# Patient Record
Sex: FEMALE | Race: WHITE
[De-identification: names, ages, dates, MRNs, and addresses within clinical notes are randomized per-mention and may not be internally consistent; named-entity substitution may affect disease eponyms.]

---

## 2018-01-20 ENCOUNTER — HOSPITAL ENCOUNTER (INPATIENT)
Dept: HOSPITAL 68 - ERH | Age: 53
LOS: 4 days | Discharge: NURSING, REHAB OR LONG-TERM CARE | DRG: 426 | End: 2018-01-24
Admitting: INTERNAL MEDICINE
Payer: COMMERCIAL

## 2018-01-20 VITALS — HEIGHT: 69 IN | BODY MASS INDEX: 43.4 KG/M2 | WEIGHT: 293 LBS

## 2018-01-20 VITALS — SYSTOLIC BLOOD PRESSURE: 121 MMHG | DIASTOLIC BLOOD PRESSURE: 73 MMHG

## 2018-01-20 DIAGNOSIS — L29.9: ICD-10-CM

## 2018-01-20 DIAGNOSIS — I25.10: ICD-10-CM

## 2018-01-20 DIAGNOSIS — L30.9: ICD-10-CM

## 2018-01-20 DIAGNOSIS — E66.01: ICD-10-CM

## 2018-01-20 DIAGNOSIS — N32.81: ICD-10-CM

## 2018-01-20 DIAGNOSIS — I95.9: ICD-10-CM

## 2018-01-20 DIAGNOSIS — F32.9: ICD-10-CM

## 2018-01-20 DIAGNOSIS — F39: ICD-10-CM

## 2018-01-20 DIAGNOSIS — Z91.81: ICD-10-CM

## 2018-01-20 DIAGNOSIS — F10.10: ICD-10-CM

## 2018-01-20 DIAGNOSIS — R26.9: ICD-10-CM

## 2018-01-20 DIAGNOSIS — I10: ICD-10-CM
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
## 2018-01-20 DIAGNOSIS — M54.9: ICD-10-CM

## 2018-01-20 DIAGNOSIS — F17.200: ICD-10-CM

## 2018-01-20 DIAGNOSIS — E87.1: Primary | ICD-10-CM

## 2018-01-20 DIAGNOSIS — R21: ICD-10-CM

## 2018-01-20 DIAGNOSIS — E87.5: ICD-10-CM

## 2018-01-20 DIAGNOSIS — F43.10: ICD-10-CM

## 2018-01-20 DIAGNOSIS — B18.2: ICD-10-CM

## 2018-01-20 DIAGNOSIS — B85.0: ICD-10-CM

## 2018-01-20 DIAGNOSIS — D64.9: ICD-10-CM

## 2018-01-20 DIAGNOSIS — K21.9: ICD-10-CM

## 2018-01-20 DIAGNOSIS — R74.0: ICD-10-CM

## 2018-01-20 DIAGNOSIS — E86.0: ICD-10-CM

## 2018-01-20 LAB
ABSOLUTE GRANULOCYTE CT: 8 /CUMM (ref 1.4–6.5)
BASOPHILS # BLD: 0 /CUMM (ref 0–0.2)
BASOPHILS NFR BLD: 0 % (ref 0–2)
EOSINOPHIL # BLD: 0 /CUMM (ref 0–0.7)
EOSINOPHIL NFR BLD: 0.2 % (ref 0–5)
ERYTHROCYTE [DISTWIDTH] IN BLOOD BY AUTOMATED COUNT: 14.8 % (ref 11.5–14.5)
GRANULOCYTES NFR BLD: 87 % (ref 42.2–75.2)
HCT VFR BLD CALC: 37.1 % (ref 37–47)
LYMPHOCYTES # BLD: 0.8 /CUMM (ref 1.2–3.4)
MCH RBC QN AUTO: 32 PG (ref 27–31)
MCHC RBC AUTO-ENTMCNC: 33.3 G/DL (ref 33–37)
MCV RBC AUTO: 96 FL (ref 81–99)
MONOCYTES # BLD: 0.4 /CUMM (ref 0.1–0.6)
PLATELET # BLD: 363 /CUMM (ref 130–400)
PMV BLD AUTO: 6.9 FL (ref 7.4–10.4)
RED BLOOD CELL CT: 3.86 /CUMM (ref 4.2–5.4)
WBC # BLD AUTO: 9.2 /CUMM (ref 4.8–10.8)

## 2018-01-20 PROCEDURE — 84133 ASSAY OF URINE POTASSIUM: CPT

## 2018-01-20 PROCEDURE — 84300 ASSAY OF URINE SODIUM: CPT

## 2018-01-20 NOTE — RADIOLOGY REPORT
EXAMINATION:
XR PORTABLE CHEST
 
CLINICAL INFORMATION:
Hyponatremia.
 
COMPARISON:
Chest 09/09/2017.
 
TECHNIQUE:
Portable frontal view of the chest was obtained.
 
FINDINGS:
Both lungs are fairly well-expanded and clear of acute process. The heart
size and pulmonary vascularity is normal. No gross bony abnormality seen.
 
IMPRESSION:
Unremarkable chest exam.

## 2018-01-20 NOTE — ED GENERAL ADULT
History of Present Illness
 
General
Chief Complaint: General Adult
Stated Complaint: BIBA FAILURE TO THRIVE
Source: patient, EMS
Exam Limitations: no limitations
 
Vital Signs & Intake/Output
Vital Signs & Intake/Output
 Vital Signs
 
 
Date Time Temp Pulse Resp B/P B/P Pulse O2 O2 Flow FiO2
 
     Mean Ox Delivery Rate 
 
 0656 98.0 109 18 112/56  93   
 
 2327 97.8 95 20 121/73  94 Room Air  
 
 2142 99.4 103 20 107/52  94 Room Air  
 
 2002 99.3 96 16 118/63  97 Room Air  
 
 1813 98.6 98 18 95/57  96 Room Air  
 
 1758       Room Air  
 
 1610 98.7 84 16 114/60  96 Room Air  
 
 1410  90 20 118/55  98 Room Air  
 
 1308       Room Air  
 
 1307 98.2 89 18 98/61  100 Room Air  
 
 
 ED Intake and Output
 
 
  0000  1200
 
Intake Total 1000 
 
Output Total 460 
 
Balance 540 
 
   
 
Intake, IV 1000 
 
Output, Urine 460 
 
Patient 325 lb 
 
Weight  
 
Weight Reported by Patient 
 
Measurement  
 
Method  
 
 
 
Reconcile Medications
Atenolol 25 MG TABLET   1 TAB PO DAILY HIGH BLOOD PRESSURE  (Reported)
Famotidine 20 MG TABLET   1 TAB PO DAILY GI  (Reported)
Fluoxetine HCl 20 MG CAPSULE   1 CAP PO DAILY DEPRESSION
Hydroxyzine Hydrochloride (Atarax) 25 MG TABLET   1 TAB PO Q8H PRN itching  (
Reported)
Lorazepam 1 MG TABLET   1 TAB PO BID anxiety
Losartan/Hydrochlorothiazide (Hyzaar 100-25 Tablet) 100 MG-25 MG TABLET   1 TAB 
PO DAILY BP  (Reported)
Meloxicam 15 MG TABLET   1 TAB PO DAILY PAIN CONTROL
Nystatin 100,000 UNIT/GRAM POWDER   1 BLANCA TOP BID RASH  (Reported)
Oxybutynin Chloride (Oxybutynin Chloride ER) 15 MG TAB.ER.24   1 TAB PO BID 
BLADDER  (Reported)
Pantoprazole Sodium (Protonix) 40 MG TABLET.DR   1 TAB PO DAILY GI  (Reported)
Triamcinolone Acetonide 0.1 % CREAM..G.   1 BLANCA TOP BID rash
     APPLY SPARINGLY OVER THE LEFT CHEST (AVOID BREAST), ABDOMEN
     AND THIGH AREAS BID
Zolpidem Tartrate (Ambien) 10 MG TABLET   1 TAB PO QHS PRN insomnia
 
Triage Nurses Notes Reviewed? yes
Onset: Gradual
Duration: hour(s):
Timing: recent history
Injury Environment: home
Severity: moderate
HPI:
51yo female with hx of HTN, depression, obesity BIBA to ED complaining of 
increasing weakness and rash across body.  Patient states that she has chronic 
pain in her back and knees and has difficulty walking at baseline.  Patient 
states that she has been on the couch for the past week, unable to walk around 
and move, she wears diapers she cannot walk to the bathroom.  Last night patient
tried to walk and moved onto the ground on her knees however she could not get 
up to walk and remain on the ground all night.  Patient's fianc called EMS 
today she could not get up.  Patient has had rash across most of her body for 
one month, she has been applying hydrocortisone cream as well as talcum powder. 
Patient states she has a fungal rash both armpits which is very painful.  She is
due to see her primary care doctor in 4 days.  Patient denies fevers, chills, 
dyspnea, chest pain, abdominal pain or vomiting.
(Elsy Cha)
Allergies
Coded Allergies:
codeine (Mild, ITCHING/HIVES 18)
Penicillins (RASH, WELTS 16)
Uncoded Allergies:
Red meat (Mild, HIVES 17)
  Secondary to Lyme disease.
 
(Emmanuel PEREZ,Imer DE LUNA)
 
Past History
 
Medical History
Any Pertinent Medical History? see below for history
Neurological: Lyme disease
EENT: NONE
Cardiovascular: hypertension
Respiratory: NONE
Gastrointestinal: constipation, GERD, Gall bladder attack approx. 17 @ [facility]
Hepatic: hepatitis C, Pt reports HCV has cleared with Harvoni Tx
Renal: INCONTINENCE Overactive bladder
Musculoskeletal: chronic back pain, degen joint disease, osteoarthritis
Psychiatric: anxiety, depression, mood disorder NOS PTSD alcohol abuse history 
of cocaine abuse
Endocrine: R/O THYROID DISEASE
Blood Disorders: anemia
Cancer(s): NONE
GYN/Reproductive: NONE
Other Medical Hx:
OVER ACTIVE BLADDER
History of MRSA: No
History of VRE: No
History of CDIFF: No
Influenza Vaccine: 17
 
Surgical History
Surgical History: non-contributory, N
 
Psychosocial History
Who do you live with Significant Other
Services at Home None
What is your primary language English
 
Family History
Family History, If Any:
Relation not specified for:
  *No pertinent family history
 
Hx Contributory? No
(Elsy Cha)
 
Review of Systems
 
Review of Systems
Constitutional:
Reports: see HPI. 
EENTM:
Reports: no symptoms. 
Respiratory:
Reports: no symptoms. 
Cardiovascular:
Reports: no symptoms. 
GI:
Reports: no symptoms. 
Genitourinary:
Reports: no symptoms. 
Musculoskeletal:
Reports: see HPI. 
Skin:
Reports: see HPI. 
Neurological/Psychological:
Reports: no symptoms. 
Hematologic/Endocrine:
Reports: no symptoms. 
Immunologic/Allergic:
Reports: no symptoms. 
All Other Systems: Reviewed and Negative
(Lacie CONKLIN,Elsy Soriano)
 
Physical Exam
 
Physical Exam
General Appearance: well developed/nourished, no apparent distress, alert, awake
, obese
Head: atraumatic, normal appearance
Eyes:
Bilateral: normal appearance. 
Ears, Nose, Throat: hearing grossly normal
Neck: normal inspection, supple, full range of motion
Respiratory: normal breath sounds, no respiratory distress, lungs clear
Cardiovascular: regular rate/rhythm
Gastrointestinal: normal bowel sounds, soft, erythematous rash with  
excoriations over trunk/abdomen extending to groin , greater on left side, +
tenderness over rash, exam limited d/t obesity
Extremities: shiny erythematous rash to bilateral axilla with tenderness
Neurologic/Psych: awake, alert, oriented x 3
Skin: see rash to trunk/abdomen, axilla, groin as described above
 
Core Measures
ACS in differential dx? No
CVA/TIA Diagnosis: No
Sepsis Present: No
Sepsis Focused Exam Completed? No
(Lacie CONKLIN,Elsy Soriano)
 
Progress
Differential Diagnoses
I considered the following diagnoses in my evaluation of the patient: [
cellulitis, electrolyte abnormality, sepsis, pneumonia, UTI, pressure ulcer, 
yeast infection]
 
Plan of Care:
 Orders
 
 
Procedure Date/time Status
 
Regular Diet  B Active
 
PHOSPHORUS  0600 Active
 
MAGNESIUM  0600 Active
 
CREATINE PHOSPHOKINASE  06 Active
 
CBC WITHOUT DIFFERENTIAL  0600 Active
 
BASIC ELECTROLYTES PLUS BUN&CR  0600 Active
 
Wound Care/Dressing  0040 Active
 
Turn and Reposition  0040 Active
 
Skin Integrity Protocol  0040 Active
 
Skin/Pressure Ulcer Assess (Sk  0040 Active
 
Lab Add-on Test   UNK Active
 
Heart Healthy Diet  D Complete
 
Vital Signs  2344 Active
 
Teach/Educate  234 Active
 
Pain Treatment and Response  2344 Active
 
Nutritional Intake, Monitor  234 Active
 
Isolation  2344 Active
 
Intake & Output  2344 Active
 
Patient Care Conference  2344 Active
 
Activity/Ambulation  2344 Active
 
Pathway - chart  2126 Active
 
Fatima, Insertion/Removal/Asses 2023 Active
 
CULTURE,URINE  1926 Active
 
BASIC ELECTROLYTES PLUS BUN&CR  1845 Complete
 
PT Evaluate & Treat  1833 Active
 
Pathway - chart  1833 Active
 
House Staff  1833 Active
 
Patient Data  1833 Active
 
Code Status  1833 Active
 
Add-on Test (ER Only)  1825 Active
 
Patient Data  1821 Active
 
Add-on Test (ER Only)  1817 Active
 
Intake & Output  1657 Active
 
URINE OSMOLALITY  1655 Complete
 
URINE DRUGS OF ABUSE  1650 Complete
 
URINE LYTES, SPOT  1650 Complete
 
LACTIC ACID  1606 Complete
 
Straight Cath  1533 Complete
 
RAPID VIRAL INFLUENZA A  1533 Complete
 
URINALYSIS  1519 Complete
 
ED Holding Orders  1516 Active
 
Admit to inpatient  1516 Active
 
Vital Signs  1516 Active
 
Code Status  1516 Complete
 
EKG  1451 Active
 
TROPONIN LEVEL  1345 Complete
 
PHOSPHORUS  1345 Complete
 
SERUM OSMOLALITY  1345 Complete
 
MAGNESIUM  1345 Complete
 
B-TYPE NATRIURETIC PEP (BNP)  1345 Complete
 
LACTIC ACID  1306 Complete
 
COMPREHENSIVE METABOLIC PANEL  1306 Complete
 
CREATINE PHOSPHOKINASE  1306 Complete
 
CBC WITHOUT DIFFERENTIAL  1306 Complete
 
Lab Add-on Test   UNK Active
 
Occupational Tx Eval & Treat   UNK Active
 
VTE Mechanical Prophylaxis   UNK Active
 
Vital Signs   UNK Complete
 
Intake & Output   UNK Complete
 
SOCIAL WORK CONSULT   UNK Active
 
 
 Current Medications
 
 
  Sig/Lesvia Start time  Last
 
Medication Dose  Stop Time Status Admin
 
Nystatin 1 BLANCA BID  010 AC 
 
(Mycostatin)     0248
 
Heparin Sodium  5,000 UNIT Q8  2330 AC 
 
(Porcine)     0539
 
Lorazepam 1 MG BID 2200 AC 
 
(Ativan)     2257
 
Oxybutynin Chloride 15 MG BID 2200 AC 
 
(Ditropan)     2257
 
Acetaminophen 650 MG Q6P PRN 2130 AC 
 
(Tylenol)     0656
 
Hydroxyzine HCl 25 MG Q8H PRN 2130 AC 
 
(Atarax)     0323
 
Oxycodone/ 1 TAB Q6P PRN 2130 AC 
 
Acetaminophen     2257
 
(Percocet)     
 
Zolpidem Tartrate 10 MG AT BEDTIME AS NEED.. 2130 AC 
 
(Ambien)     
 
Sodium Chloride 1,000 ML Q10H  183 AC 
 
(Normal Saline 0.9%)     0323
 
 
 Laboratory Tests
 
 
 
18 0000:
Anion Gap 11, Estimated GFR > 60, BUN/Creatinine Ratio 30.0  H
 
18 1748:
Lactic Acid 1.5
 
18 1655:
Urine Osmolality 317
 
18 1650:
Urine Color YEL, Urine Clarity HAZY  H, Urine pH 6.5, Ur Specific Gravity 1.010,
Urine Protein NEG, Urine Ketones NEG, Urine Nitrite POS  H, Urine Bilirubin NEG,
Urine Urobilinogen 0.2, Ur Leukocyte Esterase MOD  H, Ur Microscopic SEDIMENT 
EXAMINED, Urine RBC 5-10  H, Urine WBC 10-15  H, Ur Epithelial Cells MOD  H, 
Urine Bacteria MANY  H, Urine Hemoglobin NEG, Urine Glucose NEG
 
18 1650:
Urine Opiates Screen < 100.00, Methadone Screen < 40, Barbiturate Screen < 60, 
Ur Phencyclidine Scrn < 6.00, Amphetamines Screen < 100, U Benzodiazepines Scrn 
< 85, Urine Cocaine Screen < 50, Urine Cannabis Screen < 5.00, Ur Random 
Creatinine 51.7, Ur Random Sodium 6  L, Ur Random Potassium 86.5, Fraction 
Sodium Excret 0.1
 
18 1345:
Anion Gap 15, Estimated GFR 58  L, BUN/Creatinine Ratio 27.0  H, Glucose 108  H,
Serum Osmolality 265  L, Lactic Acid 2.6  H, Calcium 9.6, Phosphorus 3.5, 
Magnesium 1.7, Total Bilirubin 0.5, AST 91  H, ALT 78  H, Alkaline Phosphatase 
97, Creatine Kinase 320  H, Troponin I 0.02, Pro-B-Natriuretic Pept 429  H, 
Total Protein 7.6, Albumin 3.9, Globulin 3.7, Albumin/Globulin Ratio 1.1, CBC w 
Diff MAN DIFF ORDERED, RBC 3.86  L, MCV 96.0, MCH 32.0  H, RDW 14.8  H, MPV 6.9 
L, Gran % 87.0  H, Lymphocytes % 8.9  L, Monocytes % 3.9, Eosinophils % 0.2, 
Basophils % 0, Absolute Granulocytes 8.0  H, Segmented Neutrophils 72, Band 
Neutrophils 6  H, Absolute Lymphocytes 0.8  L, Lymphocytes 12  L, Monocytes 9, 
Absolute Monocytes 0.4, Absolute Eosinophils 0, Basophils 1, Absolute Basophils 
0, Platelet Estimate ADEQUATE, Normocytic RBCs VERIFIED, Normochromic RBCs 
VERIFIED, PUBS MCHC 33.3
 Microbiology
 192  URINE ROUT: Urine Culture - COLB
 1606  NASOPHARYN: Influenza Virus A & B Rapid Smear - COMP
 
Patient's labs reveal hyperkalemia, hyponatremia.  Patient's lactic acid is 
elevated. Patient also hypotensive here in ED. patient medicated with IVFs. EKG 
without acute changes. CXR within normal limits.
 
Patient's blood work is abnormal and patient also has very poor hygiene and 
unstable gait.  This patient requires hospital admission for further evaluation 
regarding her abnormal labs, possible PT/OT consult.  Patient will also require 
case management evaluation prior to discharge. The patient was discussed with 
Dr. Benitez
 
Spoke with hospitalist Dr. Manzanares regarding general medicine admission.
Diagnostic Imaging:
Viewed by Me: Radiology Read.  Discussed w/RAD: Radiology Read. 
Radiology Impression: PATIENT: BRIELLE MARTINEZ  MEDICAL RECORD NO: [id number] PRESENT
AGE: 52  PATIENT ACCOUNT NO: [id number] : 63  LOCATION: [facility] ORDERING 
PHYSICIAN: Elsy CONKLIN     SERVICE DATE: 7235 EXAM TYPE: RAD -
XRY-PORTABLE CHEST XRAY EXAMINATION: XR PORTABLE CHEST CLINICAL INFORMATION: 
Hyponatremia. COMPARISON: Chest 2017. TECHNIQUE: Portable frontal view of 
the chest was obtained. FINDINGS: Both lungs are fairly well-expanded and clear 
of acute process. The heart size and pulmonary vascularity is normal. No gross 
bony abnormality seen. IMPRESSION: Unremarkable chest exam. DICTATED BY: Aminah PEREZ
,Praveen  DATE/TIME DICTATED:18 :RAD.SWEET  DATE/TIME
TRANSCRIBED:18 CONFIDENTIAL, DO NOT COPY WITHOUT APPROPRIATE 
AUTHORIZATION.  <Electronically signed in Other Vendor System>                  
                                                                     SIGNED BY: 
Aminah PEREZ,Praveen 18 [id number]
Initial ED EKG: sinus rhythm @95 bpm, nonspecific ST changes
Prior EKG: unchanged
(Elsy Cha)
 
Departure
 
Departure
Disposition: STILL A PATIENT
Condition: Stable
Clinical Impression
Primary Impression: Hyperkalemia
Secondary Impressions: Gait instability, Hyponatremia
Referrals:
Sue George MD (PCP/Family)
 
Departure Forms:
Customer Survey
General Discharge Information
 
Admission Note
Spoke With:
Paulette Manzanares MD
Documentation of Exam:
Documentation of any treatments & extenuating circumstances including Concerns 
Regarding Discharge (functional status, medication knowledge or non-compliance, 
living conditions, etc.) that warrant an admission rather than observation: [
Hyperkalemia, hyponatremia with hypotension and in stable gait requiring IV 
fluid rehydration, repeat labs, PT/OT consults, case management consult, 
premature discharge would be medically unsafe]
 
(Elsy Cha)
 
PA/NP Co-Sign Statement
Statement:
ED Attending supervision documentation-
 
[X] I saw and evaluated the patient. I have also reviewed all the pertinent lab 
results and diagnostic results. I agree with the findings and the plan of care 
as documented in the PA's/NP's documentation. 
 
[X] I have reviewed the ED Record and agree with the PA's/NP's documentation.
 
[] Additions or exceptions (if any) to the PAs/NP's note and plan are 
summarized below:
[Patient to be admitted for hyponatremia.  Patient is having profound weakness. 
The patient only physical therapy evaluation and treatment.  She may require 
nephrology.]
 
(Emmanuel PEREZ,Imer DE LUNA)
 
Critical Care Note
 
Critical Care Note
Critical Care Time: non-applicable
(Elsy Cha)

## 2018-01-20 NOTE — HISTORY & PHYSICAL
Mauricio PEREZ,Nadya 01/20/18 1823:
General Information and [facility]
MD Statement:
I have seen and personally examined BREILLE MARTINEZ and documented this H&P.
 
The patient is a 52 year old F who presented with a patient stated chief 
complaint of [pain all over].
 
Source of Information: patient
Exam Limitations: clinical condition
History of Present Illness:
Patient is a 52-year-old female with past medical history significant for CAD, 
hypertension, chronic back pain, GERD, degenerative joint disease, suicide 
attempts presented to the ER after a fall.  Patient is morbidly obese with a BMI
of 50 and she usually stays in the couch for most of the day due to significant 
arthritis.  Patient stays on her left side preferably, occasionally showers, 
uses diaper for urination.  For the past few days she is experiencing decreased 
appetite and she fell down due to weakness in her knees and back around 4 AM.  
She remained on the ground until 12 PM today.  Apparently her fianc tried to 
help her mobilize but unable to & subsequently called 911.
 
She reportedly had an extensive skin rash for the past 3 years for which she was
admitted 3 months ago at [facility].  She reportedly had biopsy during that 
admission but however not informed of results.  She was reportedly discharged 
with steroids/antibiotics and didn't get better.  The only medication that makes
her itching betteer is Atarax. She has been on steroid cream for the rash 
provided by her PCP. 
 
She did have significant DJD requiring pain medications. Her PCP used to provide
her, however due to issues with license not able to provide for the past 2yrs. 
She was referred to pain management but unable to follow up due to long weight 
times.
 
Review of systems is positive for cough, allergy to multiple and mental triggers
including dust since childhood, nausea, urinary incontinence for the past 10 
years.
 
She reportedly had increased urination and discontinued her water pill by 
herself.
 
She smokes half a pack per day for the past 30 years and drinks alcohol 3 times 
per week for the past 30 years
 
She was evaluated by dermatologist during her admission at [facility], but 
never followed up with any dermatologist
 
Allergies/Medications
Allergies:
Coded Allergies:
codeine (Mild, ITCHING/HIVES 01/20/18)
Penicillins (RASH, WELTS 02/29/16)
Uncoded Allergies:
Red meat (Mild, HIVES 09/30/17)
  Secondary to Lyme disease.
 
Home Med list
Atenolol 25 MG TABLET   1 TAB PO DAILY HIGH BLOOD PRESSURE  (Reported)
Famotidine 20 MG TABLET   1 TAB PO DAILY GI  (Reported)
Fluoxetine HCl 20 MG CAPSULE   1 CAP PO DAILY DEPRESSION
Hydroxyzine Hydrochloride (Atarax) 25 MG TABLET   1 TAB PO Q8H PRN itching  (
Reported)
Lorazepam 1 MG TABLET   1 TAB PO BID anxiety
Losartan/Hydrochlorothiazide (Hyzaar 100-25 Tablet) 100 MG-25 MG TABLET   1 TAB 
PO DAILY BP  (Reported)
Meloxicam 15 MG TABLET   1 TAB PO DAILY PAIN CONTROL
Nystatin 100,000 UNIT/GRAM POWDER   1 BLANCA TOP BID RASH  (Reported)
Oxybutynin Chloride (Oxybutynin Chloride ER) 15 MG TAB.ER.24   1 TAB PO BID 
BLADDER  (Reported)
Pantoprazole Sodium (Protonix) 40 MG TABLET.DR   1 TAB PO DAILY GI  (Reported)
Triamcinolone Acetonide 0.1 % CREAM..G.   1 BLANCA TOP BID rash
     APPLY SPARINGLY OVER THE LEFT CHEST (AVOID BREAST), ABDOMEN
     AND THIGH AREAS BID
Zolpidem Tartrate (Ambien) 10 MG TABLET   1 TAB PO QHS PRN insomnia
 
Compliance With Home Meds: UNKNOWN
 
Past History
 
Travel History
Traveled to [place] past 21 day No
 
Medical History
Neurological: Lyme disease
EENT: NONE
Cardiovascular: hypertension
Respiratory: NONE
Gastrointestinal: constipation, GERD, Gall bladder attack approx. 9/1/17 @ [facility]
Hepatic: hepatitis C, Pt reports HCV has cleared with [facility]
Renal: INCONTINENCE Overactive bladder
Musculoskeletal: chronic back pain, degen joint disease, osteoarthritis
Psychiatric: anxiety, depression, mood disorder NOS PTSD alcohol abuse history 
of cocaine abuse
Endocrine: R/O THYROID DISEASE
Blood Disorders: anemia
Cancer(s): NONE
GYN/Reproductive: NONE
Other Medical Hx:
OVER ACTIVE BLADDER
History of MRSA: No
History of VRE: No
History of CDIFF: No
Influenza Vaccine: 08/31/17
 
Surgical History
Surgical History: non-contributory, N
 
Past Family/Social History
 
Family History
Relations & Conditions if any
Relation not specified for:
  *No pertinent family history
 
 
Psychosocial History
Who Do You Live With? fiance
Services at Home: None
Primary Language: English
ETOH Use: occasional use
Illicit Drug Use: denies illicit drug use
Living Will? unknown
 
Functional Ability
ADLs
Independent: dressing, eating, toileting, bathing. 
Ambulation: walker
IADLs
Needs Assist: shopping, housework, finances, food prep. 
 
Review of Systems
 
Review of Systems
Constitutional:
Reports: see HPI. 
EENTM:
Reports: see HPI. 
Cardiovascular:
Reports: see HPI. 
Respiratory:
Reports: see HPI. 
GI:
Reports: see HPI. 
 
Exam & Diagnostic Data
Last 24 Hrs of Vital Signs/I&O
 Vital Signs
 
 
Date Time Temp Pulse Resp B/P B/P Pulse O2 O2 Flow FiO2
 
     Mean Ox Delivery Rate 
 
01/20 1813 98.6 98 18 95/57  96 Room Air  
 
01/20 1758       Room Air  
 
01/20 1610 98.7 84 16 114/60  96 Room Air  
 
01/20 1410  90 20 118/55  98 Room Air  
 
01/20 1308       Room Air  
 
01/20 1307 98.2 89 18 98/61  100 Room Air  
 
 
 Intake & Output
 
 
 01/20 1600 01/20 0800 01/20 0000
 
Intake Total   
 
Output Total   
 
Balance   
 
    
 
Patient 147.418 kg  
 
Weight   
 
Weight Reported by Patient  
 
Measurement   
 
Method   
 
 
 
 
Physical Exam
General Appearance Alert, Oriented X3, Cooperative
Skin multiple scratching with excoriation all over the body left greater than 
right. Multiple areas of desquamation exposing mucous membranes., axillary 
tenderness with palpation left greater than right, head lice
Skin Temp/Moisture Exam: Warm/Dry
Sepsis Skin Exam (color): Mottled, erythematous
HEENT Atraumatic, PERRLA
Neck Supple, No JVD
Cardiovascular Normal S1, Normal S2
Lungs Clear to Auscultation, Normal Air Movement
Abdomen Normal Bowel Sounds, Soft, No Tenderness
Neurological Normal Tone, Sensation Intact
Extremities No Clubbing, No Cyanosis
Vascular Normal Pulses
Body Front and Back (Adult)
 
  1) Extensive scratching with excoriation
  2) Exquisite tenderness to palpation
  3) Head lice
Last 24 Hrs of Labs/Scott:
 Laboratory Tests
 
01/20/18 1748:
Lactic Acid 1.5
 
01/20/18 1655:
Urine Osmolality 317
 
01/20/18 1650:
Urine Color YEL, Urine Clarity HAZY  H, Urine pH 6.5, Ur Specific Gravity 1.010,
Urine Protein NEG, Urine Ketones NEG, Urine Nitrite POS  H, Urine Bilirubin NEG,
Urine Urobilinogen 0.2, Ur Leukocyte Esterase MOD  H, Ur Microscopic SEDIMENT 
EXAMINED, Urine RBC 5-10  H, Urine WBC 10-15  H, Ur Epithelial Cells MOD  H, 
Urine Bacteria MANY  H, Urine Hemoglobin NEG, Urine Glucose NEG
 
01/20/18 1650:
Urine Opiates Screen < 100.00, Methadone Screen < 40, Barbiturate Screen < 60, 
Ur Phencyclidine Scrn < 6.00, Amphetamines Screen < 100, U Benzodiazepines Scrn 
< 85, Urine Cocaine Screen < 50, Urine Cannabis Screen < 5.00, Ur Random 
Creatinine 51.7, Ur Random Sodium 6  L, Ur Random Potassium 86.5, Fraction 
Sodium Excret 0.1
 
01/20/18 1345:
Anion Gap 15, Estimated GFR 58  L, BUN/Creatinine Ratio 27.0  H, Glucose 108  H,
Serum Osmolality 265  L, Lactic Acid 2.6  H, Calcium 9.6, Total Bilirubin 0.5, 
AST 91  H, ALT 78  H, Alkaline Phosphatase 97, Creatine Kinase 320  H, Troponin 
I 0.02, Pro-B-Natriuretic Pept 429  H, Total Protein 7.6, Albumin 3.9, Globulin 
3.7, Albumin/Globulin Ratio 1.1, CBC w Diff MAN DIFF ORDERED, RBC 3.86  L, MCV 
96.0, MCH 32.0  H, RDW 14.8  H, MPV 6.9  L, Gran % 87.0  H, Lymphocytes % 8.9  L
, Monocytes % 3.9, Eosinophils % 0.2, Basophils % 0, Absolute Granulocytes 8.0  
H, Segmented Neutrophils 72, Band Neutrophils 6  H, Absolute Lymphocytes 0.8  L,
Lymphocytes 12  L, Monocytes 9, Absolute Monocytes 0.4, Absolute Eosinophils 0, 
Basophils 1, Absolute Basophils 0, Platelet Estimate ADEQUATE, Normocytic RBCs 
VERIFIED, Normochromic RBCs VERIFIED, PUBS MCHC 33.3
 Microbiology
01/20 1926  URINE ROUT: Urine Culture - COLB
01/20 1606  NASOPHARYN: Influenza Virus A & B Rapid Smear - COMP
 
 
 
Assessment/Plan
Assessment:
Patient is a 52-year-old female with past medical history of hypertension, 
chronic back pain (not on pain medications for the past 2 years), chronic and 
extensive skin rash for the past 3 years presented to [facility] after a fall due 
to deconditioning from morbid obesity.  Vital signs are notable for mild 
hypotension 90 x 60 mmHg at presentation 1 saturating well on room air.  
Physical examination is remarkable for significant chronic eczematous changes 
with open mucosal areas.  Labs are notable for white count of 9.2 with bandemia,
sodium 123, potassium 5.7, BUN/creatinine 27/1.0, lactic acid 2.6--> 1.5 (1L 
fluid).  Urinalysis is hazy with positive nitrates and leukocyte esterase.  
Normal chest x-ray.  Flu swab is negative. 
 
Differentials
Fall due to deconditioning/unusual body habitus
Dehydration resulting in hypovolemic hyponatremia/hyperkalemia
Disseminated dermatitis with superadded fungal infection - ??  Autoimmune skin 
reaction/allergic reaction to fleas at home
 
Plan
 
Admit to general medicine floor
 
Problem list
1.  Widely disseminated inflammatory/eczematous skin rash
2.  Fall due to morbid obesity/unusual body habitus
3.  Hyponatremia/hyperkalemia in the setting of dehydration
4.  Head lice
5. ??  Fleas at home causing rash
 
Disseminated skin eruption
Differential is broad including allergic, fungal, autoimmune, drug related 
pathologies.  [facility]bt[facility] 
would definitely guide further therapy.  She might benefit from systemic 
steroids, antibiotics. wound consult. ID consult for further evaluation/
recommendations.  In between we will continue her hydroxyzine, steroid creams.
 
Falls due to gait instability/morbid obesity
Patient's quality of life is significantly affected by her morbid obesity.  She 
will definitely benefit from PT/OT evaluation.  Once her dermatomal issues are 
cleared she would benefit from bariatric surgery/referral.
 
Electrolytemia
Hyponatremia/hyperkalemia due to dehydration.  Hydrate with normal saline at the
rate of 100 mL per hour.  Repeat BEP every 6 hours.  IV calcium gluconate and a 
single dose of Kayexalate given very mild changes in precordial T waves.  Repeat
EKG tomorrow.
 
Head lice and fleas at home
she is very unkempt with lice in her head.  Lice free spray.  Isolation 
precautions.
 
History of hypertension, coronary artery disease, GERD, degenerative joint 
disease, chronic back pain, urinary incontinence
Currently holding all antihypertensives given mild low blood pressure.  Continue
oxybutynin for urinary incontinence.  Place her on Fatima catheter.  Follow-up 
urine culture.
 
DVT prophylaxis
SC heparin
 
Code status
Full code
 
As Ranked By This Provider
Problem List:
 1. Hyponatremia
 
 2. Hyperkalemia
 
 3. Dermatitis
 
 4. Chronic pruritus
 
 
Core Measures/Misc (9/17)
 
Acute Coronary Syndrome
ACS Diagnosis: No
 
Congestive Heart Failure
Congestive Heart Failure Diagnosis No
 
Cerebrovascular Accident
CVA/TIA Diagnosis: No
 
VTE (View Protocol)
VTE Risk Factors Acute Medical Illness
No Mechanical VTE Prophylaxis d/t N/A MechProphylax Ordered
No VTE Pharm Prophylaxis d/t NA PharmProphylax ordered
 
Sepsis (View protocol)
Sepsis Present: No
 
Resident Review Statement
Resident Statement: examined this patient, discussed with intern, agreed with 
intern, discussed with family, reviewed EMR data (avail), discussed with nursing
, discussed with case mgmt, reviewed images, amended to note
 
 
Paulette Manzanares 01/21/18 0104:
Attending MD Review Statement
 
Attending Statement
Attending MD Statement: examined this patient, discuss w/resident/PA/NP, agreed 
w/resident/PA/NP, reviewed EMR data (avail), reviewed images, amended to note
Attending Assessment/Plan:
 
CC: Fall
PMH: HTN, overactive bladder (on diapers), chronic pain, GERD, depression/
anxiety with suicidal attempt in the past, alcohol abuse
 
Patient was brought in ER through EMS after found on floor. According to patient
she was trying to go somewhere at 4 AM but controlled off from couch and was 
lying on the floor, could not get up by herself. She was lying that until now 
when her boyfriend/fianc came in, tried to help her but he could not so he 
called EMS. Patient denies any injury from fall, no head strike, no loss of 
consciousness, remembers the episode, denies any other pain. She has chronic 
back pain and knee pain because of which she is limited in ambulation. She walks
with walker, and her fiance helps her in daily activities. She used to be on 
opiates in the past, then was following up with pain clinic, but now take Aleve 
or Tylenol as and when required. 
Patient also complains of intense itching, pain in her skin lesions. Patient had
been having skin itching and lesions since a few months on and off, more so in 
last 1 month. Patient keeps on scratching her skin lesions, only relief is with 
her hydroxyzine. She also has significant rash and fungal infection in her 
armpits. Patient denies any fever at home but endorses chills, denies any chest 
pain, cough, expectoration, urinary burning, irritation but cannot control her 
urine. Her fianc told to ER staff that she may have head lice.
 
Vitals: T max 99.3, pulse 80s, RR 18, blood pressure 98/61 on arrival improved 
to 118/55, saturating 100% on room air.
On exam: A O 3, morbidly obese, cooperative, distress due to pain, neck supple,
JVD normal, no lymphadenopathy, mucosa moist, no focal neurological deficit, no 
dependent edema, CVS: S1-S2, RRR. RS: Clear to auscultate bilaterally. Abdomen: 
Soft, NT, ND, bowel sounds present. Extensive skin excoriations more prominent 
in the left side of the body With superficial skin breakdowns at several sites 
especially under the buttock, near the thigh, on the breast all in the left side
, small punctate yellowish discolorations on some lesions, foul-smelling 
discharge, red and tender to touch, extremely poor hygiene, hair tangled and 
matted, tried to examine for head lice but unable to do it at this point. 
 
Labs: WBC 9.2, hemoglobin 12.3, hematocrit 37.1, platelets 363, neutrophils 87%,
bands 6, sodium 123, potassium 4.7, chloride 82, bicarbonate 26, BUN 27, 
creatinine 1.0, anion gap 15, glucose 108, calcium 9.6, lactate 2.6, bilirubin 
0.5, AST 91, ALT 78, alkaline phosphatase 97, troponin 0.02, ,
UA positive for nitrites and leukocyte esterase
CXR: Unremarkable chest exam. 
ECG: No acute changes 
 
Assessment and plan
 
52-year-old morbidly obese female who was brought in ER through through EMS 
after found on floor. Patient states that she fell around 4 AM, could not get up
and was lying down until noon when her fianc came but could not help her 
either. He called EMS. Patient denied any prodromal symptoms before falling, 
remembers the fall, no head trauma. Incidentally she was found to have severe 
skin excoriation and breakdown with multiple bleeding wounds and some of the 
wounds with all smelling status discharge. All the wounds are red but does not 
appear to be cellulitic at this point. She has axillary fungal infection with 
foul-smelling discharge. Patient has mild left shift with 6 bands, if this 
worsens then skin lesions may require antibiotic treatment . She was also found 
to have hyponatremia, hyperkalemia without ECG changes. 
 
+ Accidental fall
+ Hyponatremia : Probably secondary to dehydration, chloride is low, urine 
sodium is low. Even though her serum osmolality is low as compared to urine 
osmolality, this appears secondary to dehydration, hypovolemic hyponatremia. We 
will give a fluid challenge and reassess
+ Hyperkalemia
+ Multiple skin lesions excoriations and breakdowns : Suspected secondary 
infection 
+ Mildly transaminitis : Appears to be chronic, probably secondary to alcoholism
or FAN 
+ Physical deconditioning
 
- Admit to general medicine
- Continue normal saline at 100 mL per hour
- Check sodium every 6 hourly, if next sodium is overcorrected, change normal 
saline to D5W, if appropriately correcting: Then continue gentle hydration 
normal saline at 75, if sodium going down, continue fluid restriction of 1200 mL
- Repeat ECG if potassium is elevated in next BMP
- IV cefazolin if fever spike, or WBC trending up
- Repeat CBC and CMP in a.m.
- Check magnesium, phosphorus today and tomorrow
- Daily CPK for 3 days : Fall
- Treatment for head lice
- Continue Fatima catheter: Urinary incontinence and local wounds
- wound care consult 
- ID consult : ?Skin breakdown with suspected secondary infection
- OT / PT 
- psychiatry consult : ? excessive scratching leading to Skin breakdown, not 
seeking treatment 
- Continue bed prodromal hydroxyzine for itching
- Nystatin powder for axillary rash : Candidal intertrigo
- Consider dermatologic consult for ?Nummular eczema 
-  consult 
- hold oral anti- HTN : restart in AM 
- Consider short-term rehabilitation
- Check TSH

## 2018-01-21 VITALS — DIASTOLIC BLOOD PRESSURE: 56 MMHG | SYSTOLIC BLOOD PRESSURE: 112 MMHG

## 2018-01-21 VITALS — SYSTOLIC BLOOD PRESSURE: 120 MMHG | DIASTOLIC BLOOD PRESSURE: 58 MMHG

## 2018-01-21 LAB
ABSOLUTE GRANULOCYTE CT: 6.1 /CUMM (ref 1.4–6.5)
BASOPHILS # BLD: 0 /CUMM (ref 0–0.2)
BASOPHILS NFR BLD: 0.3 % (ref 0–2)
EOSINOPHIL # BLD: 0.2 /CUMM (ref 0–0.7)
EOSINOPHIL NFR BLD: 2 % (ref 0–5)
ERYTHROCYTE [DISTWIDTH] IN BLOOD BY AUTOMATED COUNT: 14.8 % (ref 11.5–14.5)
GRANULOCYTES NFR BLD: 69.4 % (ref 42.2–75.2)
HCT VFR BLD CALC: 31.3 % (ref 37–47)
LYMPHOCYTES # BLD: 1.4 /CUMM (ref 1.2–3.4)
MCH RBC QN AUTO: 32.6 PG (ref 27–31)
MCHC RBC AUTO-ENTMCNC: 33.3 G/DL (ref 33–37)
MCV RBC AUTO: 97.9 FL (ref 81–99)
MONOCYTES # BLD: 1.1 /CUMM (ref 0.1–0.6)
PLATELET # BLD: 315 /CUMM (ref 130–400)
PMV BLD AUTO: 7.2 FL (ref 7.4–10.4)
RED BLOOD CELL CT: 3.2 /CUMM (ref 4.2–5.4)
WBC # BLD AUTO: 8.8 /CUMM (ref 4.8–10.8)

## 2018-01-21 NOTE — CT SCAN REPORT
EXAMINATION:
CT ABDOMEN AND PELVIS WITH CONTRAST
 
CLINICAL INFORMATION:
Loose stools with cramping and significant skin lesions.
 
COMPARISON:
None
 
TECHNIQUE:
Multidetector volumetric imaging was performed of the abdomen and pelvis
following IV administration of 95 mL of Optiray 320 intravenous contrast.
Sagittal and coronal reformatted images were obtained on the technologist's
workstation.
 
DLP:
1592.95 mGy-cm
 
FINDINGS:
 
LUNG BASES: The visualized lung bases are unremarkable.
 
LIVER, GALLBLADDER, AND BILIARY TREE: The liver is normal in size and shape.
There is diffuse hepatic steatosis. No focal hepatic lesion or biliary ductal
dilatation is present. There is a large calcified stone in the gallbladder.
No gallbladder wall thickening or pericholecystic fluid.
 
PANCREAS: Unremarkable.
 
SPLEEN: Unremarkable.
 
ADRENAL GLANDS: Unremarkable.
 
KIDNEYS AND URETERS: The kidneys are normal in size shape and attenuation. No
hydronephrosis or hydroureter. There is a 4 mm linear calcification in the
midpole region of the right kidney which may represent a vascular
calcification versus a nonobstructive renal calculus..
 
BLADDER: Decompressed with Fatima catheter in place.
 
GASTROINTESTINAL TRACT: There are a few scattered colonic diverticula. No
diverticulitis. No obstruction. No abnormal thickening.
 
ABDOMINAL WALL: No significant hernia. Small amount of air in the superficial
subcutaneous soft tissues of the right intra-abdominal wall likely related to
recent injection.
 
LYMPH NODES: Normal.
 
VASCULAR: Mild scattered atherosclerotic changes of the abdominal aorta and
its branches.
 
PELVIC VISCERA: Unremarkable.
 
OSSEOUS STRUCTURES: Mild degenerative changes of the partially visualized
spine.
 
IMPRESSION:
No acute abdominal/pelvic pathology.
 
Chronic findings, as above.

## 2018-01-21 NOTE — IP INCIDENTAL NOTE PSYCH
Incidental Note
Notation:
53yo female with hx of HTN, depression, obesity BIBA to ED complaining of 
increasing weakness and rash across body.  Pt with chronic pain, presenting 
depressed, with head lice, body fungal rash.  Pt admitted to B.  Consult place
for "unkept" by Dr Fleming for attending Dr Manzanares.  Chart and labs reviewed, 
case discussed with intern Dr Hunt.  
 
 Per medicine H&P:
 
"Patient is a 52-year-old female with past medical history of hypertension, 
chronic back pain (not on pain medications for the past 2 years), chronic and 
extensive skin rash for the past 3 years presented to [facility] after a fall due 
to deconditioning from morbid obesity.  Vital signs are notable for mild 
hypotension 90 x 60 mmHg at presentation 1 saturating well on room air.  
Physical examination is remarkable for significant chronic eczematous changes 
with open mucosal areas.  Labs are notable for white count of 9.2 with bandemia,
sodium 123, potassium 5.7, BUN/creatinine 27/1.0, lactic acid 2.6--> 1.5 (1L 
fluid).  Urinalysis is hazy with positive nitrates and leukocyte esterase.  
Normal chest x-ray.  Flu swab is negative. 
 
Problem list
1.  Widely disseminated inflammatory/eczematous skin rash
2.  Fall due to morbid obesity/unusual body habitus
3.  Hyponatremia/hyperkalemia in the setting of dehydration
4.  Head lice
5. ??  Fleas at home causing rash"
 
Plan:  Per team, pt is nonurgent consult and may wait until Monday for full 
psychaitric evaluation for further once pt is more medically stabilized.  At 
this time:
 
-cont to hold fluoxetine until lytes/LA stabilized, as you are doing
-may give atarax 25 mg po q6h for anxiety, may help with rash/itch but monitor 
for confustion d/t anticholinergic effect
-cont ativan and zolpidem as you are doing, use conservative doses until 
stabilized
-no current SI per medical team, if presents then please have sitter with pt 
until psych can fully eval, should not leave AMA
-if steriods used, please be cautious of mood changes or psychosis 
-please add TSH/reflex T4, B12, RPR/VDRL, lipids to labs
-plan discussed with medical team
-Psychiatry C/L team to eval pt on Monday
-please call with urgent concerns

## 2018-01-21 NOTE — ADMISSION CERTIFICATION
Admission Certification
 
Certification Statement
- As attending physician, I certify that at the time of
- admission, based on clinical presentation, severity of
- symptoms, need for further diagnostic testing and
- therapeutic interventions, and risk of adverse outcomes
- without in-hospital treatment, in my clinical assessment,
- this patient requires an acute hospital stay for a minimum
- of two nights or longer. I have also considered psychsocial
- factors such as support system, advanced age, financial
- issues, cognitive issues, and failed out-patient treatments,
- past re-admission history, safety of patient, and lack of
- compliance as applicable.
Specific rationale supporting this admission is:
hyponatremia

## 2018-01-21 NOTE — PN- HOUSESTAFF
**See Addendum**
Subjective
Follow-up For:
HYPERKALEMIA
HYPONATREMIA
GENERALIZED DERMATITIS 
GAIT INSTABLIITY
Complaints: pain scale (0-10)
Subjective:
PATIENT SEEN AND EXAMINED.  NOTED TO BE IN A LOT OF PAIN RELATING TO HER CHRONIC
BACK ISSUES (DEGEN DISKS) AND ALSO TO HER DISSEMINATED SKIN ERUPTION.  PRURITIC,
EXCORIATIONS WIDESPRAIN. SUPPURATIVE LESIONS STUCK TO HOSPITAL GOWN.  PATIENT 
APPEARS QUITE UNCOMFORTABLE.  
 
Review of Systems
Constitutional:
Reports: no symptoms. 
Cardiovascular:
Reports: no symptoms. 
Respiratory:
Reports: no symptoms. 
Gastrointestinal:
Reports: no symptoms. 
Musculoskeletal:
Reports: back pain. 
Skin:
Reports: see HPI, erythema, lesions. 
 
Objective
Last 24 Hrs of Vital Signs/I&O
 Vital Signs
 
 
Date Time Temp Pulse Resp B/P B/P Pulse O2 O2 Flow FiO2
 
     Mean Ox Delivery Rate 
 
 0656 98.0 109 18 112/56  93   
 
 2327 97.8 95 20 121/73  94 Room Air  
 
 2142 99.4 103 20 107/52  94 Room Air  
 
 2002 99.3 96 16 118/63  97 Room Air  
 
 1813 98.6 98 18 95/57  96 Room Air  
 
 1758       Room Air  
 
 1610 98.7 84 16 114/60  96 Room Air  
 
 1410  90 20 118/55  98 Room Air  
 
 1308       Room Air  
 
 1307 98.2 89 18 98/61  100 Room Air  
 
 
 Intake & Output
 
 
  1600  0800  0000
 
Intake Total  1150 1200
 
Output Total  800 1110
 
Balance  350 90
 
    
 
Intake, IV  800 1200
 
Intake, Oral  350 
 
Output, Urine  800 1110
 
Patient   324 lb
 
Weight   
 
Weight   Bed scale
 
Measurement   
 
Method   
 
 
 
 
Physical Exam
General Appearance: Alert, Oriented X3, Cooperative, Mild Distress
Skin: MULTIPLE SUPPURATIVE LESIONS ON LEFT HIP AND BREAST THAT ARE STUCK TO 
HOSPITAL GOWN.  ERYTHEMA IN BODY FOLDS.  TENDERNESS ON ANY PALPATION NEAR THESE 
AREAS.  LARGE ABDOMEN TATTOO LEFT SIDE.
Skin Temp/Moisture Exam: Warm/Dry
Sepsis Skin Exam (color): Normal for Ethnicity
HEENT: Atraumatic, PERRLA
Cardiovascular: Regular Rate, Normal S1, Normal S2
Lungs: Clear to Auscultation
Abdomen: Normal Bowel Sounds, Soft, No Hepatospenomegaly
Neurological: Normal Speech
Extremities: No Cyanosis, Normal Pulses
Vascular: Normal Pulses, Pulses Symmetrical
Current Medications:
 Current Medications
 
 
  Sig/Lesvia Start time  Last
 
Medication Dose Route Stop Time Status Admin
 
Acetaminophen 650 MG Q6P PRN  213 AC 
 
  PO   0656
 
Calcium Gluconate 1 GM ONCE ONE  1900 DC 
 
Sodium Chloride 100 ML IV  195  1935
 
Heparin Sodium  5,000 UNIT Q8  2330 AC 
 
(Porcine)  SC   0539
 
Hydroxyzine HCl 25 MG Q8H PRN  213 AC 
 
  PO   1144
 
Lorazepam 1 MG BID 2200 AC 
 
  PO   1001
 
Non-Formulary  0 SEE ADMIN CRITERIA 2115 DC 
 
Medication  ANY 
 
Nystatin 1 BLANCA BID  0100  
 
  TOP   1001
 
Oxybutynin Chloride 15 MG BID 2200 AC 
 
  PO   1001
 
Oxycodone/ 1 TAB Q6P PRN 2130 AC 
 
Acetaminophen  PO   2257
 
Potassium Chloride 40 MEQ ONCE ONE  1015 DC 
 
  PO  1016  1144
 
Sodium Chloride 1,000 ML Q10H  1830 AC 
 
  IV   0323
 
Sodium Chloride 1,000 ML BOLUS ONE  1515 DC 
 
  IV  1614  1536
 
Sodium Polystyrene  60 ML ONCE ONE  1915 DC 
 
Sulfonate  PO 1916  2038
 
Zolpidem Tartrate 10 MG AT BEDTIME AS NEED.. 2130 AC 
 
  PO   
 
 
 
 
Last 24 Hrs of Lab/Scott Results
Last 24 Hrs of Labs/Mics:
 Laboratory Tests
 
18 1220:
Sodium Pending
 
18 0620:
Anion Gap 11, Estimated GFR > 60, BUN/Creatinine Ratio 31.7  H, Phosphorus 3.4, 
Magnesium 1.7, Creatine Kinase 237  H, TSH 3.050, CBC w Diff NO MAN DIFF REQ, 
RBC 3.20  L, MCV 97.9, MCH 32.6  H, RDW 14.8  H, MPV 7.2  L, Gran % 69.4, 
Lymphocytes % 16.1  L, Monocytes % 12.2  H, Eosinophils % 2.0, Basophils % 0.3, 
Absolute Granulocytes 6.1, Absolute Lymphocytes 1.4, Absolute Monocytes 1.1  H, 
Absolute Eosinophils 0.2, Absolute Basophils 0, PUBS MCHC 33.3
 
18 0600:
Phosphorus Cancelled, Magnesium Cancelled
 
18 0000:
Anion Gap 11, Estimated GFR > 60, BUN/Creatinine Ratio 30.0  H
 
18 1748:
Lactic Acid 1.5
 
18 1655:
Urine Osmolality 317
 
18 1650:
Urine Color YEL, Urine Clarity HAZY  H, Urine pH 6.5, Ur Specific Gravity 1.010,
Urine Protein NEG, Urine Ketones NEG, Urine Nitrite POS  H, Urine Bilirubin NEG,
Urine Urobilinogen 0.2, Ur Leukocyte Esterase MOD  H, Ur Microscopic SEDIMENT 
EXAMINED, Urine RBC 5-10  H, Urine WBC 10-15  H, Ur Epithelial Cells MOD  H, 
Urine Bacteria MANY  H, Urine Hemoglobin NEG, Urine Glucose NEG
 
18 1650:
Urine Opiates Screen < 100.00, Methadone Screen < 40, Barbiturate Screen < 60, 
Ur Phencyclidine Scrn < 6.00, Amphetamines Screen < 100, U Benzodiazepines Scrn 
< 85, Urine Cocaine Screen < 50, Urine Cannabis Screen < 5.00, Ur Random 
Creatinine 51.7, Ur Random Sodium 6  L, Ur Random Potassium 86.5, Fraction 
Sodium Excret 0.1
 
18 1345:
Anion Gap 15, Estimated GFR 58  L, BUN/Creatinine Ratio 27.0  H, Glucose 108  H,
Serum Osmolality 265  L, Lactic Acid 2.6  H, Calcium 9.6, Phosphorus 3.5, 
Magnesium 1.7, Total Bilirubin 0.5, AST 91  H, ALT 78  H, Alkaline Phosphatase 
97, Creatine Kinase 320  H, Troponin I 0.02, Pro-B-Natriuretic Pept 429  H, 
Total Protein 7.6, Albumin 3.9, Globulin 3.7, Albumin/Globulin Ratio 1.1, CBC w 
Diff MAN DIFF ORDERED, RBC 3.86  L, MCV 96.0, MCH 32.0  H, RDW 14.8  H, MPV 6.9 
L, Gran % 87.0  H, Lymphocytes % 8.9  L, Monocytes % 3.9, Eosinophils % 0.2, 
Basophils % 0, Absolute Granulocytes 8.0  H, Segmented Neutrophils 72, Band 
Neutrophils 6  H, Absolute Lymphocytes 0.8  L, Lymphocytes 12  L, Monocytes 9, 
Absolute Monocytes 0.4, Absolute Eosinophils 0, Basophils 1, Absolute Basophils 
0, Platelet Estimate ADEQUATE, Normocytic RBCs VERIFIED, Normochromic RBCs 
VERIFIED, PUBS MCHC 33.3
 Microbiology
 1606  NASOPHARYN: Influenza Virus A & B Rapid Smear - COMP
 
 
 
Assessment/Plan
Assessment:
Patient is a 52-year-old female with past medical history of hypertension, 
chronic back pain (not on pain medications for the past 2 years), chronic and 
extensive skin rash for the past 3 years presented to [place] after a fall due 
to deconditioning from morbid obesity.  Vital signs are notable for mild 
hypotension 90 x 60 mmHg at presentation 1 saturating well on room air.  
Physical examination is remarkable for significant chronic eczematous changes 
with open mucosal areas.  Labs are notable for white count of 9.2 with bandemia,
sodium 123, potassium 5.7, BUN/creatinine 27/1.0, lactic acid 2.6--> 1.5 (1L 
fluid).  Urinalysis is hazy with positive nitrates and leukocyte esterase.  
Normal chest x-ray.  Flu swab is negative. 
 
PROBLEMS
Fall due to deconditioning/unusual body habitus
Dehydration resulting in hypovolemic hyponatremia/hyperkalemia
Disseminated dermatitis with suppurative desquamated lesions ??fungal infection 
- ??Autoimmune skin reaction/allergic reaction to fleas at home ??pressure 
ulcers as patient lays on that side.  ??irritation as patient had not showered 
in some time.
 
Plan
 
 
Disseminated skin eruption
Differential is broad including allergic, fungal, autoimmune, drug related 
pathologies.  Obtaining records from here previous admission at [facility]  would 
definitely guide further therapy.  WILL OBTAIN ON MONDAY.  She might benefit 
from systemic steroids, antibiotics. wound consult. ID consult for further 
evaluation/recommendations is deferred until wound consult.  We will continue 
her hydroxyzine FOR ITCHING, NYSTATIN CREAM as she states she has previously 
diagnosed axillary yeast infections. 
 
Falls due to gait instability/morbid obesity
Patient's quality of life is significantly affected by her morbid obesity.  She 
will definitely benefit from PT/OT evaluation.  PYSCHIATRIC EVALUATION AS 
PATIENT HAS A POOR QUALITY OF LIFE SECONDARY TO BACK ISSUES/PSYCHIATRIC ISSUES 
WHICH HAVE CAUSED SIGNIFICANT WEIGHT GAIN.  PSYCH TO SEE ON MONDAY. 
 
Electrolytemia
Hyponatremia/hyperkalemia due to dehydration.  Hydrate with normal saline at the
rate of 100 mL per hour.   IV calcium gluconate and a single dose of Kayexalate 
given very mild changes in precordial T waves.  AFTERWARDS, K DIPPED TO 3.3 SO 
KDUR GIVEN TODAY.
 
Head lice and fleas at home
PATIENT TOLD US SHE HAS A CURRENT LICE INFECTION.  Lice free spray.  Isolation 
precautions.
 
History of hypertension, coronary artery disease, GERD, degenerative joint 
disease, chronic back pain, urinary incontinence
Currently holding all antihypertensives given mild low blood pressure.  Continue
oxybutynin for urinary incontinence.  ZOLPIDEM FOR SLEEP. ATIVAN PRN FOR ANXIETY
1MG BID. Place her on Fatima catheter.  Follow-up urine culture. 
 
DVT prophylaxis
SC heparin
 
Code status
Full code
 
Problem List:
 1. Gait instability
 
 2. Hyponatremia
 
 3. Hyperkalemia
 
 4. Dermatitis
 
Pain Ratin
Pain Location:
GENERALIZED SKIN AND BACK
Pain Goal: Pain 7 or less
Pain Plan:
PERCOCET
Tomorrow's Labs & Rationales:
CBC BEP

## 2018-01-22 VITALS — SYSTOLIC BLOOD PRESSURE: 128 MMHG | DIASTOLIC BLOOD PRESSURE: 66 MMHG

## 2018-01-22 VITALS — DIASTOLIC BLOOD PRESSURE: 76 MMHG | SYSTOLIC BLOOD PRESSURE: 152 MMHG

## 2018-01-22 VITALS — DIASTOLIC BLOOD PRESSURE: 58 MMHG | SYSTOLIC BLOOD PRESSURE: 110 MMHG

## 2018-01-22 LAB
ABSOLUTE GRANULOCYTE CT: 4.4 /CUMM (ref 1.4–6.5)
BASOPHILS # BLD: 0 /CUMM (ref 0–0.2)
BASOPHILS NFR BLD: 0.3 % (ref 0–2)
EOSINOPHIL # BLD: 0.3 /CUMM (ref 0–0.7)
EOSINOPHIL NFR BLD: 4.7 % (ref 0–5)
ERYTHROCYTE [DISTWIDTH] IN BLOOD BY AUTOMATED COUNT: 14.5 % (ref 11.5–14.5)
GRANULOCYTES NFR BLD: 62.4 % (ref 42.2–75.2)
HCT VFR BLD CALC: 30.8 % (ref 37–47)
LYMPHOCYTES # BLD: 1.4 /CUMM (ref 1.2–3.4)
MCH RBC QN AUTO: 33.1 PG (ref 27–31)
MCHC RBC AUTO-ENTMCNC: 33.9 G/DL (ref 33–37)
MCV RBC AUTO: 97.6 FL (ref 81–99)
MONOCYTES # BLD: 0.9 /CUMM (ref 0.1–0.6)
PLATELET # BLD: 323 /CUMM (ref 130–400)
PMV BLD AUTO: 7.1 FL (ref 7.4–10.4)
RED BLOOD CELL CT: 3.16 /CUMM (ref 4.2–5.4)
WBC # BLD AUTO: 7 /CUMM (ref 4.8–10.8)

## 2018-01-22 NOTE — DISCHARGE SUMMARY
Visit Information
 
Visit Dates
Admission Date:
01/20/18
 
Discharge Date:
1/24/18
 
Hospital Course
 
Course
Attending Physician:
Augusto Carrasquillo MD
 
Primary Care Physician:
Sue George MD
 
Consulting Request:
   Consulting Specialty: Dermatology
   Consulting Physician:

   Reason for Consult: Extensive skin eruption
Hospital Course:
Patient is a 52-year-old female with past medical history of hypertension, 
chronic back pain (not on pain medications for the past 2 years), chronic and 
extensive skin rash for the past 3 years presented to [place] after a fall due 
to deconditioning from morbid obesity.  Vital signs are notable for mild 
hypotension 90 x 60 mmHg at presentation 1 saturating well on room air.  
Physical examination is remarkable for significant chronic eczematous changes 
with open mucosal areas.  Labs are notable for white count of 9.2 with bandemia,
sodium 123, potassium 5.7, BUN/creatinine 27/1.0, lactic acid 2.6--> 1.5 (1L 
fluid).  Urinalysis is hazy with positive nitrates and leukocyte esterase.  
Normal chest x-ray.  Flu swab is negative. 
 
Differentials
Fall due to deconditioning/unusual body habitus
Dehydration resulting in hypovolemic hyponatremia/hyperkalemia
Disseminated dermatitis with superadded fungal infection - ??  Autoimmune skin 
reaction/allergic reaction to fleas at home
 
Plan
 
Admit to general medicine floor
 
Problem list
1.  Widely disseminated inflammatory/eczematous skin rash
2.  Fall due to morbid obesity/unusual body habitus
3.  Hyponatremia/hyperkalemia in the setting of dehydration
4.  Head lice
5. ??  Fleas at home causing rash
 
Disseminated skin eruption
               Differential is broad including allergic, fungal, autoimmune, 
drug related pathologies. consulted dermatology who suspects it to be fungal. 
Consutled plastic surgery who suspects a fungal rash, did a biopsy at bedside. 
 
Falls due to gait instability/morbid obesity
              Patient's quality of life is significantly affected by her morbid 
obesity.  She will definitely benefit from PT/OT evaluation.  Once her 
dermatomal issues are cleared she would benefit from bariatric surgery/referral.
She was started on triamcinolone and mupirocin cream. Suggested to have soap 
water shower everyday and moisturizing cream to the open wounds. She used to 
sleep on her left side so they are pressure wounds as well so obtained a wound 
consult suggested avoiding pressure on left side, xeroform & guaze dressing.
 
Suspected INNA
              She did have day time drowsiness with BMI of 47. She would benefit
from sleep study to evaluate for INNA as outpatient. 
 
Electrolytemia
              Hyponatremia/hyperkalemia due to dehydration.  Hydrated with 
normal saline at the rate of 100 mL per hour.  Repeat BEP every 6 hours.  IV 
calcium gluconate and a single dose of Kayexalate given very mild changes in 
precordial T waves. discontinued fluoxetine as it could be causing SIADH and 
hyponatremia.
 
Head lice 
               she is very unkempt with lice in her head at admission.  Lice 
free spray was given.  Isolation precautions were taken. we could never find 
them by ourselves but she was treated for it as ER people found them.
 
UTI
         She had a dirty urine with positive urine cultures for K.pneumoniae. 
She is started on bactrim which could help with UTI and superadded bacterial 
skin infections if any. She was placed on hicks intially which was discontinued 
after positive cultures.
 
 
History of hypertension, coronary artery disease, GERD, degenerative joint 
disease, chronic back pain, urinary incontinence
Intially holded on all antihypertensives given mild low blood pressure.  
Continued oxybutynin for urinary incontinence. Restarted on atenolol and other 
antihypertensives at discharge.
  
DVT prophylaxis
SC heparin
 
Code status
Full code
Complications:
none
Allergies:
Coded Allergies:
codeine (Mild, ITCHING/HIVES 01/20/18)
Penicillins (RASH, WELTS 02/29/16)
Uncoded Allergies:
Red meat (Mild, HIVES 09/30/17)
  Secondary to Lyme disease.
 
Significant Procedures:
CXR
IMPRESSION:
Unremarkable chest exam.
 
CT abdomen and pelvis due to loose stools
IMPRESSION:
No acute abdominal/pelvic pathology.
Pertinent Lab Results:
as above
 
Disposition Summary
 
Disposition
Principal Diagnosis:
Fall secondary to deconditioning
Additional Diagnosis:
Hyponatremia
Hyperkalemia
UTI
Head lice
HTN
urinary incontinence
Discharge Disposition: short term rehab
 
Discharge Instructions
 
General Discharge Information
Code Status: Full Code
Patient's Diet:
as tolerated
Patient's Activity:
Maximal assist
Follow-Up Instructions/Appts:
1.  PLEASE FOLLOW UP WITH YOUR NEW PCP IN ONE WEEK, DR BOURNE AT [facility]
2.  PLEASE SEE DR. GRISSOM PULMONOLOGIST FOR SLEEP STUDY IN 1-2 WEEKS
3.  PLEASE FOLLOW UP WITH WOUND CARE IN ONE WEEK, DR. VALENCIA HERE [facility]
4. PLEASE SEE DR. HOLDEN FOR PAIN MANAGEMENT.
5.  PLEASE SEE DR. ROSE DERMATOLOGIST IN 1 WEEK
 
 
FOR SNR:  
PLEASE DRESS WOUNDS:
 
XEROFORMS WITH DRY GAUZE ON TOP TWICE DAILY WITH BACITRACIN UNDER XEROFORM TO 
avoid having the Xeroform dry and stick to the open wounds.  Would avoid paper 
tape until skin integrity is established.  Would suggests soap and water shower 
each day but avoid prolonged exposure to hot water and aggressive soap so as not
to complicate the eczema. 
 
 
Medications at Discharge
Discharge Medications:
Stop taking the following medications:
Triamcinolone Acetonide (Triamcinolone Acetonide) 0.1 % CREAM..G. On the skin 
TWICE DAILY Qty = 90
 
Lorazepam (Lorazepam) 1 MG TABLET ORAL TWICE DAILY Qty = 20
 
Fluoxetine HCl (Fluoxetine HCl) 20 MG CAPSULE ORAL DAILY Qty = 14
 
Continue taking these medications:
Pantoprazole Sodium (Protonix) 40 MG TABLET.DR
    1 Tablet ORAL DAILY
 
Losartan/Hydrochlorothiazide (Hyzaar 100-25 Tablet) 100 MG-25 MG TABLET
    1 Tablet ORAL DAILY
 
Oxybutynin Chloride (Oxybutynin Chloride ER) 15 MG TAB.ER.24
    1 Tablet ORAL TWICE DAILY
    Comments:
       LAST GIVEN 1/24/18 @ 1020
 
Hydroxyzine Hydrochloride (Atarax) 25 MG TABLET
    1 Tablet ORAL Q8H as needed for itching
    Comments:
       LAST CHICHI 1/24/18 @ 1020
 
Atenolol (Atenolol) 25 MG TABLET
    1 Tablet ORAL DAILY
    Comments:
       LAST GIVEN 1/24/18/ @ 1600
 
Zolpidem Tartrate (Ambien) 10 MG TABLET
    1 Tablet ORAL TAKE AT BEDTIME as needed for insomnia
    Qty = 10
 
Meloxicam (Meloxicam) 15 MG TABLET
    1 Tablet ORAL DAILY
    Qty = 15
 
Famotidine (Famotidine) 20 MG TABLET
    1 Tablet ORAL DAILY
 
Nystatin (Nystatin) 100,000 UNIT/GRAM POWDER
    1 Application On the skin TWICE DAILY
    Qty = 60
    Comments:
       LAST GIVEN 1/24/18 @ 1020
 
Start taking the following new medications:
Sulfamethoxazole/Trimethoprim (Bactrim Ds Tablet) 800 MG-160 MG TABLET
    1 Tablet ORAL TWICE DAILY
    Qty = 7
    No Refills
 
Triamcinolone Acetonide (Triamcinolone Acetonide) 0.1 % CREAM..G.
    1 Application On the skin TWICE DAILY
    Qty = 30
    No Refills
    Instructions:
       please apply all over area of dermatitis/eczema twice a day with
       mupirocin cream
 
Mupirocin (Mupirocin) 2 % OINT...G.
    1 Application On the skin TWICE DAILY
    Qty = 22
    No Refills
    Instructions:
       apply to affected area(s)
 
Bacitracin (Bacitracin) 500 UNIT/GRAM OINT...G.
    1 Application On the skin TWICE DAILY
    Qty = 30
    No Refills
    Instructions:
       apply to affected area(s)
    Comments:
       LAST GIVEN 1/24/18 @ 1/24/18
 
 
Copies To:
Freda PEREZ,Freda; Leo PEREZ,Jimbo; Ariel PEREZ,Sue
 
Attending MD Review Statement
Documenting Attending:
Foreign PEREZ,Augusto
Other Findings:
52-year-old morbidly obese female who was brought in ER through through EMS 
after found on floor. Fall unwitnessed. Patient found to have hyponatrmeia with 
low urine sodium which is improved with IVF. Her CT abd/pelvis negative for 
acute pathology. Baseline mobility low due to degenrative disease. 
 
1. Accidental fall
2. Hyponatremia 2/2 IVVD
3. Multiple skin lesions excoriations and breakdowns : Suspected secondary 
infection 
4. Mildly transaminitis : Appears to be chronic, probably secondary to 
alcoholism or FAN 
5. Physical deconditioning
6. Fungal rash
7. Head Lice
8. Impetigo on supportive care. 
 
 
- CT abd/pelvis negative for acute pathology.
- IV abx for possible UTI. Change to PO bactrim.
- wound care consult appreciated, supportive care.
- psychiatry consulted and follow recs. 
- Continue bed prodromal hydroxyzine for itching
- Nystatin powder for axillary rash : Candidal intertrigo
- Dermatologic f/u as o/p at [place] or our dermatologist here as per patient 
preference for impetigo. cont supportive care. 
- dc planning on PT recs to [facility]. 
- plan of care dwed family bedside. Plan for biopsy by Dr Melendez bedside, f/u o/
p. 
- PCP referral at discharge. 
- pulomanry referral at discharge for CPAP. 
 
Discharge plan discussed with patient/family bedside. Discharge instructions 
given regarding her wound care.

## 2018-01-22 NOTE — PN- HOUSESTAFF
Gilbert PEREZ,Katia 18 1117:
Subjective
Follow-up For:
HYPERKALEMIA
HYPONATREMIA
GENERALIZED DERMATITIS 
GAIT INSTABLIITY
Complaints: pain scale (0-10)
Subjective:
patient is in pain from her numerous wounds.  no overnight events.  
 
Review of Systems
Constitutional:
Reports: weakness. 
Skin:
Reports: dryness, erythema, lesions, lumps, rash. 
Neurological/Psychological:
Reports: depressed. 
 
Objective
Last 24 Hrs of Vital Signs/I&O
 Vital Signs
 
 
Date Time Temp Pulse Resp B/P B/P Pulse O2 O2 Flow FiO2
 
     Mean Ox Delivery Rate 
 
 1443 97.8 107 20 110/58  91 Room Air  
 
 0629 98.3 112 20 128/66  95   
 
 
 Intake & Output
 
 
  1600  0800  0000
 
Intake Total 1280 1280 1600
 
Output Total 300 650 
 
Balance 
 
    
 
Intake,  800 800
 
Intake, Oral 480 480 800
 
Number 0 0 
 
Bowel   
 
Movements   
 
Output, Urine 300 650 
 
 
 
 
Physical Exam
General Appearance: Alert, Oriented X3, Cooperative, Moderate Distress
Skin: numerous erythematous lesions with serosanguinous discharge that has 
drained on the bed and caused sheets to stick to her.  ezcema like rash 
elsewhere.  lesions are mostly on the left side.  under her armpit, on her 
breast, and on her hip.
HEENT: Atraumatic, PERRLA, unkempt and malodorous scalp
Cardiovascular: Regular Rate, Normal S1, Normal S2
Lungs: Clear to Auscultation, Normal Air Movement
Abdomen: Normal Bowel Sounds, Soft, No Tenderness
Current Medications:
 Current Medications
 
 
  Sig/Lesvia Start time  Last
 
Medication Dose Route Stop Time Status Admin
 
Acetaminophen 650 MG .STK-MED ONE  1014 DC 
 
  PO  1015  
 
Acetaminophen 650 MG .STK-MED ONE  0216 DC 
 
  PO  0217  
 
Acetaminophen 650 MG Q6P PRN  2130 AC 
 
  PO   1018
 
Heparin Sodium  5,000 UNIT Q8  2330 
 
(Porcine)  SC   1324
 
Hydroxyzine HCl 25 MG Q8H PRN  2130 AC 
 
  PO   1027
 
Ibuprofen 400 MG Q6P PRN  0230 AC 
 
  PO   1713
 
Lorazepam 1 MG BID  2200 
 
  PO   0856
 
Nystatin 1 BLANCA BID  0100 
 
  TOP   0857
 
Oxybutynin Chloride 15 MG BID  2200  
 
  PO   0856
 
Oxycodone/ 1 TAB Q6P PRN  213  
 
Acetaminophen  PO   2100
 
Sodium Chloride 1,000 ML Q10H  1830  
 
  IV   0209
 
Zolpidem Tartrate 10 MG AT BEDTIME AS NEED.. 2130 AC 
 
  PO   
 
 
 
 
Last 24 Hrs of Lab/Scott Results
Last 24 Hrs of Labs/Mics:
 Laboratory Tests
 
18 1455:
Serum Osmolality 270  L
 
18 1441:
Urine Osmolality 553, Ur Random Creatinine 226.5, Ur Random Sodium 7  L, Ur 
Random Potassium 28.4, Fraction Sodium Excret < 1.0
 
18 0805:
Anion Gap 13, Estimated GFR > 60, BUN/Creatinine Ratio 23.3, CBC w Diff NO MAN 
DIFF REQ, RBC 3.16  L, MCV 97.6, MCH 33.1  H, RDW 14.5, MPV 7.1  L, Gran % 62.4,
Lymphocytes % 20.3  L, Monocytes % 12.3  H, Eosinophils % 4.7, Basophils % 0.3, 
Absolute Granulocytes 4.4, Absolute Lymphocytes 1.4, Absolute Monocytes 0.9  H, 
Absolute Eosinophils 0.3, Absolute Basophils 0, PUBS MCHC 33.9
 
180:
 Microbiology
1939  STOOL: Stool Culture - CAN
                Cancelled: SPECIMEN NOT RECEIVED IN LABORATORY
 
 
 
Assessment/Plan
Assessment:
Patient is a 52-year-old female with past medical history of hypertension, 
chronic back pain (not on pain medications for the past 2 years), chronic and 
extensive skin rash for the past 3 years presented to [facility] after a fall due 
to deconditioning from morbid obesity.  Vital signs are notable for mild 
hypotension 90 x 60 mmHg at presentation saturating well on room air.  Physical 
examination is remarkable for significant chronic eczematous changes with open 
mucosal areas.  Labs are notable for white count of 9.2 with bandemia, sodium 
123, potassium 5.7, BUN/creatinine 27/1.0, lactic acid 2.6--> 1.5 (after 1L 
fluid).  Urinalysis is hazy with positive nitrates and leukocyte esterase.  
Normal chest x-ray.  Flu swab is negative. 
 
PROBLEMS
Fall due to deconditioning/unusual body habitus
Dehydration resulting in hypovolemic hyponatremia/hyperkalemia
Disseminated dermatitis with suppurative desquamated lesions ??fungal infection 
- ??Autoimmune skin reaction/allergic reaction to fleas at home ??pressure 
ulcers as patient lays on that side.  ??irritation as patient had not showered 
in some time.
Questionable head lice
 
Plan
 
 
Disseminated skin eruption
Differential is broad including allergic, fungal, autoimmune, drug related 
pathologies.  we obtained records from her [facility] admission which was for 
acute cholecystitis and showed some of the same rashes that were tested for 
arthropod but negative.  She might benefit from systemic steroids, antibiotics 
but for now we will await wound consult and treatment.  Of note, wound did not 
come today again and patient is in continuous pain with her wounds sticking to 
the sheets. for now surgical padding has been applied but direction from wound 
care is needed.  ID consult for further evaluation/recommendations is deferred 
until wound consult.  We will continue her hydroxyzine FOR ITCHING, NYSTATIN 
CREAM as she states she has previously diagnosed axillary yeast infections. 
DERMATOLOGY CONSULT TODAY. I have spoken to derm and they will see patient 
tomorrow. 
IV CEFAZOLIN IN FEVERS
 
Falls due to gait instability/morbid obesity
Patient's quality of life is significantly affected by her morbid obesity.  She 
will definitely benefit from PT/OT evaluation.  PYSCHIATRIC EVALUATION AS 
PATIENT HAS A POOR QUALITY OF LIFE SECONDARY TO BACK ISSUES/PSYCHIATRIC ISSUES 
WHICH HAVE CAUSED SIGNIFICANT WEIGHT GAIN.  PSYCH TO SEE ON today. 
 
Electrolytemia
Hyponatremia/hyperkalemia due to dehydration AND DIARRHEA.  Hydrate with normal 
saline at the rate of 100 mL per hour CONTINUES.   IV calcium gluconate and a 
single dose of Kayexalate given very mild changes in precordial T waves.  
AFTERWARDS, K DIPPED TO 3.3 SO KDUR GIVEN TODAY AGAIN FOR PERSISTENTLY LOW K. NA
 TODAY.
CT ABD PELVIS NEG FOR ANY ACUTE PATHOLOGY
OF NOTE HYDROXYZINE AND ZOLPIDEM CAN CAUSE HYPONATREMIA
-WE WILL DO URINE AND SERUM OSM TODAY
 
 
Head lice and fleas at home
PATIENT TOLD US SHE HAS A CURRENT LICE INFECTION.  WE COULD NOT FIND EVIDENCE OF
INFESATION AND HAVE DC'D CONTACT PRECAUTIONS.
 
History of hypertension, coronary artery disease, GERD, degenerative joint 
disease, chronic back pain, urinary incontinence
Currently holding all antihypertensives given mild low blood pressure.  Continue
oxybutynin for urinary incontinence.  ZOLPIDEM FOR SLEEP. ATIVAN PRN FOR ANXIETY
1MG BID. Place her on Fatima catheter.  Follow-up urine culture. 
 
PLACEMENT
PT IS SUGGESTING STR
 
DVT prophylaxis
SC heparin
 
Code status
Full code
 
Problem List:
 1. Hyponatremia
 
 2. Hyperkalemia
 
 3. Dermatitis
 
 4. Gait instability
 
Pain Ratin
Pain Location:
widespread lesions
Pain Goal: Pain 7 or less
Pain Plan:
tylenol and percocet
Tomorrow's Labs & Rationales:
cbc bep
 
 
Augusto Carrasquillo 18 1132:
Attending MD Review Statement
 
Attending Statement
Attending MD Statement: examined this patient, discuss w/resident/PA/NP, agreed 
w/resident/PA/NP, discussed with family, reviewed EMR data (avail), discussed 
with nursing, discussed with case mgmt, reviewed images, amended to note
Attending Assessment/Plan:
52-year-old morbidly obese female who was brought in ER through through EMS 
after found on floor. Fall unwitnessed. Patient found to have hyponatrmeia with 
low urine sodium which is improving with IVF. Her CT abd/pelvis negative for 
acute pathology. Baseline mobility low due to degenrative disease. 
 
1. Accidental fall
2. Hyponatremia 2/2 IVVD
3. Multiple skin lesions excoriations and breakdowns : Suspected secondary 
infection 
4. Mildly transaminitis : Appears to be chronic, probably secondary to 
alcoholism or FAN 
5. Physical deconditioning
6. Fungal rash
7. Head Lice
 
 
- Continue ivf, monitor sodium. CT abd/pelvis negative for acute pathology.
- IV cefazolin if fever spike, or WBC trending up, f/u culture.
- Continue Fatima catheter: Urinary incontinence and local wounds
- wound care consult pending
- OT / PT 
- psychiatry consulted and follow recs. 
- Continue bed prodromal hydroxyzine for itching
- Nystatin powder for axillary rash : Candidal intertrigo
- Dermatologic consult.
-  consult 
- dc planning on PT recs.

## 2018-01-23 VITALS — SYSTOLIC BLOOD PRESSURE: 130 MMHG | DIASTOLIC BLOOD PRESSURE: 60 MMHG

## 2018-01-23 VITALS — SYSTOLIC BLOOD PRESSURE: 140 MMHG | DIASTOLIC BLOOD PRESSURE: 78 MMHG

## 2018-01-23 VITALS — DIASTOLIC BLOOD PRESSURE: 64 MMHG | SYSTOLIC BLOOD PRESSURE: 129 MMHG

## 2018-01-23 LAB
ABSOLUTE GRANULOCYTE CT: 3.6 /CUMM (ref 1.4–6.5)
BASOPHILS # BLD: 0 /CUMM (ref 0–0.2)
BASOPHILS NFR BLD: 0.4 % (ref 0–2)
EOSINOPHIL # BLD: 0.4 /CUMM (ref 0–0.7)
EOSINOPHIL NFR BLD: 7.1 % (ref 0–5)
ERYTHROCYTE [DISTWIDTH] IN BLOOD BY AUTOMATED COUNT: 15 % (ref 11.5–14.5)
GRANULOCYTES NFR BLD: 58.3 % (ref 42.2–75.2)
HCT VFR BLD CALC: 28.5 % (ref 37–47)
LYMPHOCYTES # BLD: 1.3 /CUMM (ref 1.2–3.4)
MCH RBC QN AUTO: 33.1 PG (ref 27–31)
MCHC RBC AUTO-ENTMCNC: 33.9 G/DL (ref 33–37)
MCV RBC AUTO: 97.5 FL (ref 81–99)
MONOCYTES # BLD: 0.8 /CUMM (ref 0.1–0.6)
PLATELET # BLD: 330 /CUMM (ref 130–400)
PMV BLD AUTO: 6.8 FL (ref 7.4–10.4)
RED BLOOD CELL CT: 2.92 /CUMM (ref 4.2–5.4)
WBC # BLD AUTO: 6.1 /CUMM (ref 4.8–10.8)

## 2018-01-23 NOTE — CONS- WOUND CARE
General Information and HPI
 
Consulting Request
Date of Consult: 01/23/18
Requested By:
Foreign PEREZ,Bob
 
Reason for Consult:
Multiple skin ulcers present on admission
History of Present Illness:
Patient is a 52-year-old morbidly obese woman sedentary admitted for electrolyte
abnormalities.  She was found to have multiple left-sided ulcers and skin 
excoriations.  She reports constantly lying in the left lateral decubitus 
position so as to be able to watch television.  He said chronic nonhealing 
ulcers of her breast and lateral abdominal wall for months.
 
Allergies/Medications
Allergies:
Coded Allergies:
codeine (Mild, ITCHING/HIVES 01/20/18)
Penicillins (RASH, WELTS 02/29/16)
Uncoded Allergies:
Red meat (Mild, HIVES 09/30/17)
  Secondary to Lyme disease.
 
Home Med List:
Atenolol 25 MG TABLET   1 TAB PO DAILY HIGH BLOOD PRESSURE  (Reported)
Famotidine 20 MG TABLET   1 TAB PO DAILY GI  (Reported)
Fluoxetine HCl 20 MG CAPSULE   1 CAP PO DAILY DEPRESSION
Hydroxyzine Hydrochloride (Atarax) 25 MG TABLET   1 TAB PO Q8H PRN itching  (
Reported)
Lorazepam 1 MG TABLET   1 TAB PO BID anxiety
Losartan/Hydrochlorothiazide (Hyzaar 100-25 Tablet) 100 MG-25 MG TABLET   1 TAB 
PO DAILY BP  (Reported)
Meloxicam 15 MG TABLET   1 TAB PO DAILY PAIN CONTROL
Nystatin 100,000 UNIT/GRAM POWDER   1 BLANCA TOP BID RASH  (Reported)
Oxybutynin Chloride (Oxybutynin Chloride ER) 15 MG TAB.ER.24   1 TAB PO BID 
BLADDER  (Reported)
Pantoprazole Sodium (Protonix) 40 MG TABLET.DR   1 TAB PO DAILY GI  (Reported)
Triamcinolone Acetonide 0.1 % CREAM..G.   1 BLANCA TOP BID rash
     APPLY SPARINGLY OVER THE LEFT CHEST (AVOID BREAST), ABDOMEN
     AND THIGH AREAS BID
Zolpidem Tartrate (Ambien) 10 MG TABLET   1 TAB PO QHS PRN insomnia
 
 
Review of Systems
Review of Systems:
Noncontributory
 
Past History
 
Travel History
Traveled to [place] past 21 day No
 
Medical History
Blood Transfusion Hx: No
Neurological: Lyme disease
EENT: NONE
Cardiovascular: hypertension
Respiratory: NONE
Gastrointestinal: constipation, GERD, Gall bladder attack approx. 9/1/17 @ [facility]
Hepatic: hepatitis C, Pt reports HCV has cleared with Harvoni Tx
Renal: INCONTINENCE Overactive bladder
Musculoskeletal: chronic back pain, degen joint disease, osteoarthritis
Psychiatric: anxiety, depression, mood disorder NOS PTSD alcohol abuse history 
of cocaine abuse
Endocrine: R/O THYROID DISEASE
Blood Disorders: anemia
Cancer(s): NONE
GYN/Reproductive: NONE
Other Medical Hx:
OVER ACTIVE BLADDER
 
Surgical History
Surgical History: none, non-contributory
 
Family History
Relations & Conditions If Any:
Relation not specified for:
  *No pertinent family history
 
 
Psychosocial History
Where Do You Live? Home
Who Do You Live With? fiance
Services at Home: None
Primary Language: English
Smoking Status: Current Everyday Smoker
ETOH Use: occasional use
Illicit Drug Use: denies illicit drug use
Living Will? unknown
 
Functional Ability
ADLs
Independent: dressing, eating, toileting, bathing. 
Ambulation: walker
IADLs
Needs Assist: shopping, housework, finances, food prep. 
 
Exam & Diagnostic Data
Vital Signs and I&O
 Vital Signs
 
 
 Result Date Time
 
Pulse Ox 93 01/23 0521
 
B/P 140/78 01/23 0521
 
O2 Delivery Room Air 01/23 0521
 
Temp 98.7 01/23 0521
 
Pulse 113 01/23 0521
 
Resp 20 01/23 0521
 
 
 Intake & Output
 
 
 01/23 0000 01/22 1600 01/22 0800
 
Intake Total 1500 1280 1280
 
Output Total 300 300 650
 
Balance 1200 980 630
 
    
 
Intake,  800 800
 
Intake, Oral 800 480 480
 
Number  0 0
 
Bowel   
 
Movements   
 
Output, Urine 300 300 650
 
 
Exam of her lateral left breast shows there to be extensive stage III pressure 
ulcer measuring approximately 4 x 4 centimeters over her lateral abdominal wall 
multiple also stage III and unstageable pressure ulcers.  There are innumerable 
excoriations over her abdomen and trunk.
 
Assessment/Plan
Impression/Plan:
52-year-old multiple medical problems morbid obesity has multiple pressure 
ulcers due to positioning in the left lateral decubitus position shows to watch 
TV all day.  The etiology of her excoriations are uncertain and dermatologic 
evaluation should be pursued.  Recommend repositioning to avoid left-sided 
pressure.  Wound care is daily cleansing and Xeroform and gauze over dressing.
 
 
Consult Acknowledgment
- Thank you for your consult request.

## 2018-01-23 NOTE — CONS- PLASTIC SURGERY
General Information and HPI
 
Consulting Request
Date of Consult: 01/23/18
Requested By:
eldon sanders
 
Reason for Consult:
skin problems
Source of Information: patient, old records
Exam Limitations: no limitations
History of Present Illness:
Patient on couch for many months to years secondary to reportedly back issues 
plus or minus depression.  Has left-sided skin issues.  Has previous diagnosis 
of eczema.  States the left side of her skin is very itchy and she scratches it 
aggressively.  As complaints on the right side.  Has had these present for many 
months with no treatments.  Has used topical corticosteroids in the past for a 
diagnosis of eczema but has not been using it as of late.  She says it does 
decrease the itch when used.  Brought to the hospital for metabolic issues.  He 
did sleep one night on a rug with a quilt due to her inability to stand up which
was this past Friday.
 
Allergies/Medications
Allergies:
Coded Allergies:
codeine (Mild, ITCHING/HIVES 01/20/18)
Penicillins (RASH, WELTS 02/29/16)
Uncoded Allergies:
Red meat (Mild, HIVES 09/30/17)
  Secondary to Lyme disease.
 
Home Med List:
Amoxicillin/Potassium Clav (Augmentin 875-125 Tablet) 875 MG-125 MG TABLET   1 
TAB PO BID UTI
Atenolol 25 MG TABLET   1 TAB PO DAILY HIGH BLOOD PRESSURE  (Reported)
Famotidine 20 MG TABLET   1 TAB PO DAILY GI  (Reported)
Fluoxetine HCl 20 MG CAPSULE   1 CAP PO DAILY DEPRESSION
Hydroxyzine Hydrochloride (Atarax) 25 MG TABLET   1 TAB PO Q8H PRN itching  (
Reported)
Lorazepam 1 MG TABLET   1 TAB PO BID anxiety
Losartan/Hydrochlorothiazide (Hyzaar 100-25 Tablet) 100 MG-25 MG TABLET   1 TAB 
PO DAILY BP  (Reported)
Meloxicam 15 MG TABLET   1 TAB PO DAILY PAIN CONTROL
Nystatin 100,000 UNIT/GRAM POWDER   1 BLANCA TOP BID RASH  (Reported)
Oxybutynin Chloride (Oxybutynin Chloride ER) 15 MG TAB.ER.24   1 TAB PO BID 
BLADDER  (Reported)
Pantoprazole Sodium (Protonix) 40 MG TABLET.DR   1 TAB PO DAILY GI  (Reported)
Triamcinolone Acetonide 0.1 % CREAM..G.   1 BLANCA TOP BID rash
     APPLY SPARINGLY OVER THE LEFT CHEST (AVOID BREAST), ABDOMEN
     AND THIGH AREAS BID
Zolpidem Tartrate (Ambien) 10 MG TABLET   1 TAB PO QHS PRN insomnia
 
 
Past History
 
Medical History
Blood Transfusion Hx: No
Type of Reaction: Itching
Neurological: Lyme disease
EENT: NONE
Cardiovascular: hypertension
Respiratory: NONE
Gastrointestinal: constipation, GERD, Gall bladder attack approx. 9/1/17 @ [facility]
Hepatic: hepatitis C, Pt reports HCV has cleared with Harvoni Tx
Renal: INCONTINENCE Overactive bladder
Musculoskeletal: chronic back pain, degen joint disease, osteoarthritis
Psychiatric: anxiety, depression, mood disorder NOS PTSD alcohol abuse history 
of cocaine abuse
Endocrine: R/O THYROID DISEASE
Blood Disorders: anemia
Cancer(s): NONE
GYN/Reproductive: NONE
Other Medical Hx:
OVER ACTIVE BLADDER
 
Surgical History
Pertinent Surgical History: none, non-contributory
 
Family History
Relations & Conditions If Any:
Relation not specified for:
  *No pertinent family history
 
 
Psychosocial History
Where Do You Live? Home
Who Do You Live With? fiance
Services at Home: None
Primary Language: English
Smoking Status: Current Everyday Smoker
ETOH Use: occasional use
Illicit Drug Use: denies illicit drug use
Living Will? unknown
 
Functional Ability
ADLs
Independent: dressing, eating, toileting, bathing. 
Ambulation: walker
IADLs
Needs Assist: shopping, housework, finances, food prep. 
 
Review of Systems
Review of Systems:
All other systems negative.
 
Exam & Diagnostic Data
Vital Signs and I&O
Vital Signs
 
 
Date Time Temp Pulse Resp B/P B/P Pulse O2 O2 Flow FiO2
 
     Mean Ox Delivery Rate 
 
01/23 1450 97.7 113 20 129/64  93 Room Air  
 
01/23 0957       Room Air  
 
01/23 0521 98.7 113 20 140/78  93 Room Air  
 
01/22 2122 98.4 106 20 152/76  96 Room Air  
 
 
 Intake & Output
 
 
 01/23 1600 01/23 0800 01/23 0000 01/22 1600 01/22 0800 01/22 0000
 
Intake Total 2162 365 4066 1280 1280 1600
 
Output Total 800 700 300 300 650 
 
Balance  
 
       
 
Intake,  700 700 800 800 800
 
Intake, Oral 600 200 800 480 480 800
 
Number    0 0 
 
Bowel      
 
Movements      
 
Output, Urine 800 700 300 300 650 
 
 
 
Physical Exam:
Obese patient with the following findings.  Significant snoring upon entering 
the room and awakening the patient.  Significant fungal infections involving the
bilateral axillary bilateral inframammary folds lower abdominal pannus upper 
medial thighs.  Some minimal superficial right lower extremity injury likely 
from scratching.  Upper medial thighs with likely eczema with a few deeper areas
likely secondary to self trauma.  Question yeast infection as well of the 
vagina.  On the left side of the trunk breast and hip there are areas that 
appeared to be eczematous in appearance with some areas of superficial injury, 
partial thickness injury and full thickness injury.  There does not appear to be
any significant cellulitis.  The largest area is an area of superficial injury 
of the left breast with a pink base.  Xeroform dressings that were removed or 
partially sticking to the open wounds as well as the paper tape.
 
Assessment/Plan
Assessment/Plan
Areas of mixed dermal injury in the setting of chronic moisture and pressure in 
a patient with previous diagnosed eczema.  Also multiple areas of fungal 
infection.  Would suggest topical treatment of eczema via the medical service or
dermatology consult.  Would consider treatment of fungal infections of multiple 
areas.  Would perform bedside biopsy around lunchtime tomorrow, possibly low 
yield but can be taken from an area of full thickness injury without adding any 
significant additional risk.
 
Wound care per Dr. Esparza and the wound center.  Might suggest adding 
significant lubrication to the Xeroform such as bacitracin or Neosporin or 
something else to avoid having the Xeroform dry and stick to the open wounds.  
Would avoid paper tape until skin integrity is established.  Consider possibly 
short course of oral antibiotics in case infection has played a secondary roll 
in the areas of full thickness injury.  Would suggests soap and water shower 
each day but avoid prolonged exposure to hot water and aggressive soap so as not
to complicate the eczema.
 
 
Consult Acknowledgment
- Thank you for your consult request.

## 2018-01-23 NOTE — PN- HOUSESTAFF
Gilbert PEREZ,Katia 18 0739:
Subjective
Follow-up For:
HYPERKALEMIA
HYPONATREMIA
GENERALIZED DERMATITIS 
GAIT INSTABLIITY
Subjective:
patient this morning continues to have leaking wounds and pain.  otherwise is 
stable no complaints.
 
Review of Systems
Constitutional:
Reports: weakness. 
Genitourinary:
Reports: frequency. 
Skin:
Reports: see HPI, erythema, lesions, lumps. 
 
Objective
Last 24 Hrs of Vital Signs/I&O
 Vital Signs
 
 
Date Time Temp Pulse Resp B/P B/P Pulse O2 O2 Flow FiO2
 
     Mean Ox Delivery Rate 
 
 0957       Room Air  
 
 05 98.7 113 20 140/78  93 Room Air  
 
 212 98.4 106 20 152/76  96 Room Air  
 
 1443 97.8 107 20 110/58  91 Room Air  
 
 
 Intake & Output
 
 
  1600  0800  0000
 
Intake Total  900 1500
 
Output Total  700 300
 
Balance  200 1200
 
    
 
Intake, IV  700 700
 
Intake, Oral  200 800
 
Output, Urine  700 300
 
 
 
 
Physical Exam
General Appearance: Alert, Oriented X3, Cooperative, Mild Distress
Skin: widespread eczema and open wounds with erythema and excoriations.  wounds 
centered on left side.
Skin Temp/Moisture Exam: Warm/Dry
Cardiovascular: Regular Rate, Normal S1, Normal S2
Lungs: Clear to Auscultation, Normal Air Movement
Abdomen: Normal Bowel Sounds, Soft, No Tenderness, No Hepatospenomegaly, No 
Masses
Extremities: No Clubbing, No Cyanosis, Normal Pulses
Current Medications:
 Current Medications
 
 
  Sig/Lesvia Start time  Last
 
Medication Dose Route Stop Time Status Admin
 
Acetaminophen 650 MG Q6P PRN 
 
  PO   1018
 
Ceftriaxone Sodium 1,000 MG DAILY  1000 AC 
 
  IV   
 
Heparin Sodium  5,000 UNIT Q8  2330 
 
(Porcine)  SC   0514
 
Hydroxyzine HCl 25 MG Q8H PRN 
 
  PO   0842
 
Ibuprofen 400 MG Q6P PRN  0230 
 
  PO   0514
 
Lorazepam 1 MG BID 
 
  PO   0908
 
Nystatin 1 BLANCA BID  0100 
 
  TOP   0908
 
Oxybutynin Chloride 15 MG BID 
 
  PO   0907
 
Oxycodone/ 1 TAB Q6P PRN 
 
Acetaminophen  PO   2100
 
Sodium Chloride 1,000 ML Q10H  183  
 
  IV   0521
 
Zolpidem Tartrate 10 MG AT BEDTIME AS NEED.. 2130  
 
  PO   
 
 
 
 
Last 24 Hrs of Lab/Scott Results
Last 24 Hrs of Labs/Mics:
 Laboratory Tests
 
18 0735:
Anion Gap 10, Estimated GFR > 60, BUN/Creatinine Ratio 16.0, Creatine Kinase 91,
CBC w Diff NO MAN DIFF REQ, RBC 2.92  L, MCV 97.5, MCH 33.1  H, RDW 15.0  H, MPV
6.8  L, Gran % 58.3, Lymphocytes % 21.5, Monocytes % 12.7  H, Eosinophils % 7.1 
H, Basophils % 0.4, Absolute Granulocytes 3.6, Absolute Lymphocytes 1.3, 
Absolute Monocytes 0.8  H, Absolute Eosinophils 0.4, Absolute Basophils 0, PUBS 
MCHC 33.9
 
18 1455:
Serum Osmolality 270  L
 
18 1441:
Urine Osmolality 553, Ur Random Creatinine 226.5, Ur Random Sodium 7  L, Ur 
Random Potassium 28.4, Fraction Sodium Excret < 1.0
 
 
Assessment/Plan
Assessment:
Patient is a 52-year-old female with past medical history of hypertension, 
chronic back pain (not on pain medications for the past 2 years), chronic and 
extensive skin rash for the past 3 years with prior history of impetigo 
presented to [facility] after a fall due to deconditioning from morbid obesity.  
Vital signs are notable for mild hypotension 90 x 60 mmHg at presentation 
saturating well on room air.  Physical examination is remarkable for significant
chronic eczematous changes with open mucosal areas.  Labs are notable for white 
count of 9.2 with bandemia, sodium 123, potassium 5.7, BUN/creatinine 27/1.0, 
lactic acid 2.6--> 1.5 (after 1L fluid).  Urinalysis is hazy with positive 
nitrates and trace leukocyte esterase.  Normal chest x-ray.  Flu swab is 
negative. 
 
PROBLEMS
Fall due to deconditioning/unusual body habitus
Dehydration resulting in hypovolemic hyponatremia/hyperkalemia
Disseminated dermatitis with suppurative desquamated lesions ??fungal infection 
- ??Autoimmune skin reaction/allergic reaction to fleas at home ??pressure 
ulcers as patient lays on that side.  ??irritation as patient had not showered 
in some time.
Questionable head lice
 
Plan
 
 
Disseminated skin eruption
Differential is broad including allergic, fungal, autoimmune, drug related 
pathologies.  we obtained records from her [facility] admission which was for 
acute cholecystitis and showed some of the same rashes that were tested for 
arthropod but negative.  She might benefit from systemic steroids, antibiotics 
but for now we will await wound consult and treatment. ID consult for further 
evaluation/recommendations is deferred until wound consult.  We will continue 
her hydroxyzine FOR ITCHING, NYSTATIN CREAM as she states she has previously 
diagnosed axillary yeast infections. DERMATOLOGY CONSULT TODAY. I have spoken to
derm and they will see patient today.
IV CEFAZOLIN IF FEVERS
Wound care saw her and suggests off-loading left side where most of the pressure
is centered as she often lies on that side.  Also xeroform and gauze dressings.
Patient has history of impetigo.  We will wait for dermatology recommendations 
regarding abx.
Patient will be covered for GNR growth in urine and urine frequency with 
ceftriaxone which would cover strep and staph (altho not best drug for this).
 
Falls due to gait instability/morbid obesity
Patient's quality of life is significantly affected by her morbid obesity. 
-PT/OT evaluation.  PYSCHIATRIC EVALUATION AS PATIENT HAS A POOR QUALITY OF LIFE
SECONDARY TO BACK ISSUES/PSYCHIATRIC ISSUES WHICH HAVE CAUSED SIGNIFICANT WEIGHT
GAIN.  PSYCH is following. 
-atarax 25q8 for anxiety- may help with rash/itch but monitor for confustion d/t
anticholinergic effect
-normal TSH 
-B12, RPR/VDRL pending
-PAIN REFERRAL ON DISCHARGE AS PATIENT HAS CHRONIC BACK PAIN
 
Electrolytemia
Hyponatremia/hyperkalemia due to dehydration AND DIARRHEA.  Hydrate with normal 
saline at the rate of 100 mL per hour CONTINUES.   IV calcium gluconate and a 
single dose of Kayexalate given very mild changes in precordial T waves.  
AFTERWARDS, K DIPPED TO 3.3 SO KDUR GIVEN TODAY AGAIN FOR PERSISTENTLY LOW K. NA
 TODAY.
CT ABD PELVIS NEG FOR ANY ACUTE PATHOLOGY
OF NOTE HYDROXYZINE AND ZOLPIDEM CAN CAUSE HYPONATREMIA
URINE  AND SERUM  FENA<1 RANDOM URINE NA 7.
Continue fluids for now.
 
UTI
Patient found to have GNR in urine with increased frequency reported at home.
Start patient on ceftriaxone.  She is pcn allergic but reaction is skin so 
cephalosporin ok. 
Remove hicks 
 
Head lice and fleas at home
PATIENT TOLD US SHE HAS A CURRENT LICE INFECTION.  WE COULD NOT FIND EVIDENCE OF
INFESATION AND HAVE DC'D CONTACT PRECAUTIONS.
 
History of hypertension, coronary artery disease, GERD, degenerative joint 
disease, chronic back pain, urinary incontinence
Currently holding all antihypertensives given mild low blood pressure.  Continue
oxybutynin for urinary incontinence.  ZOLPIDEM FOR SLEEP. ATIVAN PRN FOR ANXIETY
1MG BID. Place her on Hicks catheter.  Follow-up urine culture. 
 
Placement
PT IS SUGGESTING STR
 
DVT prophylaxis
SC heparin
 
Code status
Full code
 
Problem List:
 1. Gait instability
 
 2. Hyponatremia
 
 3. Hyperkalemia
 
 4. Dermatitis
 
Pain Ratin
Pain Location:
widespread skin lesions and back pain
Pain Goal: Pain 4 or less
Pain Plan:
percocet 
Tomorrow's Labs & Rationales:
cbc bep
Consulting Request:
   Consulting Specialty: Dermatology
   Consulting Physician:

   Reason for Consult: Extensive skin eruption
 
 
Augusto Carrasquillo 18 1311:
Attending MD Review Statement
 
Attending Statement
Attending MD Statement: examined this patient, discuss w/resident/PA/NP, agreed 
w/resident/PA/NP, discussed with family, reviewed EMR data (avail), discussed 
with nursing, discussed with case mgmt, reviewed images, amended to note
Attending Assessment/Plan:
52-year-old morbidly obese female who was brought in ER through through EMS 
after found on floor. Fall unwitnessed. Patient found to have hyponatrmeia with 
low urine sodium which is improving with IVF. Her CT abd/pelvis negative for 
acute pathology. Baseline mobility low due to degenrative disease. 
 
1. Accidental fall
2. Hyponatremia 2/2 IVVD
3. Multiple skin lesions excoriations and breakdowns : Suspected secondary 
infection 
4. Mildly transaminitis : Appears to be chronic, probably secondary to 
alcoholism or FAN 
5. Physical deconditioning
6. Fungal rash
7. Head Lice
8. Impetigo on supportive care. 
 
 
- Continue ivf, monitor sodium. CT abd/pelvis negative for acute pathology.
- IV abx for possible UTI. 
- Discontinue hicks cathter, f/u urine culture C/S. 
- wound care consult appreciated, supportive care.
- psychiatry consulted and follow recs. 
- Continue bed prodromal hydroxyzine for itching
- Nystatin powder for axillary rash : Candidal intertrigo
- Dermatologic f/u as op/p at [place] for impetigo. cont supportive care. 
-  f/u
- dc planning on PT recs to STR. 
- plan of care dwed family bedside.

## 2018-01-23 NOTE — PATIENT DISCHARGE INSTRUCTIONS
Discharge Instructions
 
General Discharge Information
You were seen/treated for:
URINARY TRACT INFECTION
IMPETIGO?
PRESSURE ULCERS
FALL
HYPONATREMIA
 
Special Instructions:
1.  PLEASE FOLLOW UP WITH YOUR NEW PCP IN ONE WEEK, DR BOURNE AT [facility]
PHYSICIANS
2.  PLEASE SEE DR. GRISSOM PULMONOLOGIST FOR SLEEP STUDY IN 1-2 WEEKS
3.  PLEASE FOLLOW UP WITH WOUND CARE IN ONE WEEK, DR. VALENCIA HERE AT WOUND 
CENTER
4. PLEASE SEE DR. HOLDEN FOR PAIN MANAGEMENT.
5.  PLEASE SEE DR. ROSE DERMATOLOGIST IN 1 WEEK
 
 
FOR SNR:  
PLEASE DRESS WOUNDS:
 
XEROFORMS WITH DRY GAUZE ON TOP TWICE DAILY WITH BACITRACIN UNDER XEROFORM TO 
avoid having the Xeroform dry and stick to the open wounds.  Would avoid paper 
tape until skin integrity is established.  Would suggests soap and water shower 
each day but avoid prolonged exposure to hot water and aggressive soap so as not
to complicate the eczema.
 
Diet
Continue normal diet: Yes
 
Activity
Full Activity/No Limits: Yes (AS TOLERATED)
 
Acute Coronary Syndrome
 
Inclusion Criteria
At DC or during hospital stay patient has or had the following:
ACS DIAGNOSIS No
 
Discharge Core Measures
Meds if any: Prescribed or Continued at Discharge
Meds if any: NOT Prescribed or Continued at Discharge
 
Congestive Heart Failure
 
Inclusion Criteria
At DC or during hospital stay patient has or had the following:
CHF DIAGNOSIS No
 
Discharge Core Measures
Meds if any: Prescribed or Continued at Discharge
Meds if any: NOT Prescribed or Continued at Discharge
 
Cerebrovascular accident
 
Inclusion Criteria
At DC or during hospital stay patient has or had the following:
CVA/TIA Diagnosis No
 
Discharge Core Measures
Meds if any: Prescribed or Continued at Discharge
Meds if any: NOT Prescribed or Continued at Discharge
 
Venous thromboembolism
 
Inclusion Criteria
VTE Diagnosis No
VTE Type NONE
VTE Confirmed by (Test) NONE
 
Discharge Core Measures
- Per Current guidelines, there needs to be overlap
- treatment for the first 5 days of Warfarin therapy.
- If discharged on Warfarin prior to 5 days of
- overlap therapy, the patient will need to be
- assessed for post discharge needs including
- *Post discharge parental anticoagulation
- *Warfarin and/or parental anticoagulation education
- *Follow up date to check INR post discharge
At least 5 days overlap therapy as Inpatient No
Meds if any: Prescribed or Continued at Discharge
Note: Overlap Therapy is Warfarin and Anticoagulant
Meds if any: NOT Prescribed or Continued at Discharge

## 2018-01-24 VITALS — SYSTOLIC BLOOD PRESSURE: 131 MMHG | DIASTOLIC BLOOD PRESSURE: 90 MMHG

## 2018-01-24 VITALS — DIASTOLIC BLOOD PRESSURE: 90 MMHG | SYSTOLIC BLOOD PRESSURE: 131 MMHG

## 2018-01-24 VITALS — SYSTOLIC BLOOD PRESSURE: 138 MMHG | DIASTOLIC BLOOD PRESSURE: 68 MMHG

## 2018-01-24 LAB
ABSOLUTE GRANULOCYTE CT: 3.5 /CUMM (ref 1.4–6.5)
BASOPHILS # BLD: 0 /CUMM (ref 0–0.2)
BASOPHILS NFR BLD: 0.5 % (ref 0–2)
EOSINOPHIL # BLD: 0.4 /CUMM (ref 0–0.7)
EOSINOPHIL NFR BLD: 6.3 % (ref 0–5)
ERYTHROCYTE [DISTWIDTH] IN BLOOD BY AUTOMATED COUNT: 15.1 % (ref 11.5–14.5)
GRANULOCYTES NFR BLD: 51.4 % (ref 42.2–75.2)
HCT VFR BLD CALC: 30.8 % (ref 37–47)
LYMPHOCYTES # BLD: 1.8 /CUMM (ref 1.2–3.4)
MCH RBC QN AUTO: 32.6 PG (ref 27–31)
MCHC RBC AUTO-ENTMCNC: 33.5 G/DL (ref 33–37)
MCV RBC AUTO: 97.3 FL (ref 81–99)
MONOCYTES # BLD: 1 /CUMM (ref 0.1–0.6)
PLATELET # BLD: 369 /CUMM (ref 130–400)
PMV BLD AUTO: 6.9 FL (ref 7.4–10.4)
RED BLOOD CELL CT: 3.17 /CUMM (ref 4.2–5.4)
WBC # BLD AUTO: 6.8 /CUMM (ref 4.8–10.8)

## 2018-01-24 NOTE — PN- WOUND CARE
Subjective
Subjective:
Patient feels better.  She was observed during sleep and has obvious sleep 
apnea.  Pressure ulcers are improved and now have predominantly red fill
 
Objective
Vital Signs and I&Os
 Vital Signs
 
 
 Result Date Time
 
Pulse Ox 95 01/24 0702
 
B/P 138/68 01/24 0702
 
Temp 97.8 01/24 0702
 
Pulse 99 01/24 0702
 
Resp 20 01/24 0702
 
O2 Delivery Room Air 01/23 2227
 
 
 Intake & Output
 
 
 01/24 0000 01/23 1600 01/23 0800
 
Intake Total 480 1400 900
 
Output Total  800 700
 
Balance 480 600 200
 
    
 
Intake, IV  800 700
 
Intake, Oral 480 600 200
 
Output, Urine  800 700
 
Patient 324 lb  
 
Weight   
 
 
Multiple pressure ulcers are improved with cleansing and Xeroform.  Continue 
offloading daily cleansing and Xeroform.  Patient should have outpatient sleep 
study
 
Impression/Plan
 
Impression/Plan
Impression/Plan:
52-year-old multiple medical problems morbid obesity has multiple pressure 
ulcers due to positioning in the left lateral decubitus position shows to watch 
TV all day.  The etiology of her excoriations are uncertain and dermatologic 
evaluation should be pursued.  Recommend repositioning to avoid left-sided 
pressure.  Wound care is daily cleansing and Xeroform and gauze over dressing.

## 2018-01-24 NOTE — PN- HOUSESTAFF
Gilbert PEREZ,Katia 18 1009:
Subjective
Follow-up For:
FALL
WIDESPREAD SKIN ISSUES
HYPONATREMIA
SLEEP APNEA?
Subjective:
PATIENT IS STILL IN PAIN, BACK AND SKIN.  OVERNIGHT NOTED TO HAVE PERIODS OF 
APNEA DURING SLEEP.
 
Review of Systems
Constitutional:
Reports: weakness. 
Cardiovascular:
Reports: no symptoms. 
Respiratory:
Reports: see HPI. 
Musculoskeletal:
Reports: back pain. 
Skin:
Reports: dryness, erythema, lesions, lumps, rash. 
 
Objective
Last 24 Hrs of Vital Signs/I&O
 Vital Signs
 
 
Date Time Temp Pulse Resp B/P B/P Pulse O2 O2 Flow FiO2
 
     Mean Ox Delivery Rate 
 
 0702 97.8 99 20 138/68  95   
 
 2227 97.9 110 21 130/60  93 Room Air  
 
 1450 97.7 113 20 129/64  93 Room Air  
 
 
 Intake & Output
 
 
  1600  0800  0000
 
Intake Total  480 480
 
Output Total   
 
Balance  480 480
 
    
 
Intake, Oral  480 480
 
Patient   324 lb
 
Weight   
 
 
 
 
Physical Exam
General Appearance: Alert, Oriented X3, Cooperative, Mild Distress
Skin: SIGNIFICANT WIDESPREAD ECZEMATOUS LESIONS, PRESSURE ULCERS, FUNGAL 
INFECTION NOTED BY DR. GUERRA PLASTIC SURGEON.
Skin Temp/Moisture Exam: Warm/Dry
Sepsis Skin Exam (color): Normal for Ethnicity
Neck: Supple
Cardiovascular: Regular Rate, Normal S1, Normal S2
Lungs: Clear to Auscultation, Normal Air Movement
Abdomen: Normal Bowel Sounds, Soft, No Tenderness
Extremities: No Clubbing, No Cyanosis, Normal Pulses, No Tenderness/Swelling
Current Medications:
 Current Medications
 
 
  Sig/Lesvia Start time  Last
 
Medication Dose Route Stop Time Status Admin
 
Acetaminophen 650 MG Q6P PRN  2130 AC 
 
  PO   1018
 
Amoxicillin/ 875 MG Q12  1000 CAN 
 
Clavulanate Potassium  PO   
 
Bacitracin 1 BLANCA BID  1000 AC 
 
  TOP   1020
 
Ceftriaxone Sodium 1,000 MG DAILY  1000 DC 
 
  IV   1141
 
Heparin Sodium  5,000 UNIT Q8  2330 AC 
 
(Porcine)  SC   0539
 
Hydroxyzine HCl 25 MG Q8H PRN  2130 AC 
 
  PO   1020
 
Ibuprofen 400 MG .STK-MED ONE  0249 DC 
 
  PO  0250  
 
Ibuprofen 400 MG .STK-MED ONE  1839 DC 
 
  PO  1840  
 
Ibuprofen 400 MG Q6P PRN  0230 AC 
 
  PO   1840
 
Lorazepam 1 MG BID  2200 AC 
 
  PO   1020
 
Morphine Sulfate 2 MG Q6P PRN  1100 AC 
 
  IV   
 
Mupirocin 1 BLANCA TID  1606 AC 
 
  TOP   2153
 
Nystatin 1 BLANCA BID  0100 AC 
 
  TOP   2152
 
Oxybutynin Chloride 15 MG BID  2200 AC 
 
  PO   1020
 
Oxycodone/ 1 TAB Q6P PRN  2130 AC 
 
Acetaminophen  PO   2100
 
Patient Medication  1 ED ONE ONE  1100 DC 
 
Teaching  ED  1101  
 
Sodium Chloride 1,000 ML Q10H  1830 DC 
 
  IV   0521
 
Triamcinolone  1 BLANCA BID 2200 AC 
 
Acetonide  TOP   2153
 
Trimethoprim/ 1 TAB BID  1000 AC 
 
Sulfamethoxazole  PO   
 
Zolpidem Tartrate 10 MG AT BEDTIME AS NEED.. 2130 AC 
 
  PO   215
 
 
 
 
Last 24 Hrs of Lab/Scott Results
Last 24 Hrs of Labs/Mics:
 Laboratory Tests
 
18 0615:
Anion Gap 13, Estimated GFR > 60, BUN/Creatinine Ratio 12.0, CBC w Diff NO MAN 
DIFF REQ, RBC 3.17  L, MCV 97.3, MCH 32.6  H, RDW 15.1  H, MPV 6.9  L, Gran % 
51.4, Lymphocytes % 26.5, Monocytes % 15.3  H, Eosinophils % 6.3  H, Basophils %
0.5, Absolute Granulocytes 3.5, Absolute Lymphocytes 1.8, Absolute Monocytes 1.0
 H, Absolute Eosinophils 0.4, Absolute Basophils 0, PUBS MCHC 33.5
 
18 1206:
Vitamin B12 991  H, RPR Titer/FTA Pending
 
 
Assessment/Plan
Assessment:
Patient is a 52-year-old female with past medical history of hypertension, 
chronic back pain (not on pain medications for the past 2 years), chronic and 
extensive skin rash for the past 3 years with prior history of impetigo 
presented to [place] after a fall due to deconditioning from morbid obesity.  
Vital signs are notable for mild hypotension 90 x 60 mmHg at presentation 
saturating well on room air.  Physical examination is remarkable for significant
chronic eczematous changes with open mucosal areas.  Labs are notable for white 
count of 9.2 with bandemia, sodium 123, potassium 5.7, BUN/creatinine 27/1.0, 
lactic acid 2.6--> 1.5 (after 1L fluid).  Urinalysis is hazy with positive 
nitrates and trace leukocyte esterase.  Normal chest x-ray.  Flu swab is 
negative. 
 
PROBLEMS
Fall due to deconditioning/unusual body habitus
Dehydration resulting in hypovolemic hyponatremia/hyperkalemia
Disseminated dermatitis with suppurative desquamated lesions ??fungal infection 
- ??Autoimmune skin reaction/allergic reaction to fleas at home ??pressure 
ulcers as patient lays on that side.  ??irritation as patient had not showered 
in some time.
Questionable head lice
 
Plan
 
 
Disseminated skin eruption
Differential is broad including allergic, fungal, autoimmune, drug related 
pathologies.  we obtained records from her [facility] admission which was for 
acute cholecystitis and showed some of the same rashes that were tested for 
arthropod but negative. ID consult for further evaluation/recommendations is 
deferred AND WE HAVE CONSULTED DERM AND PLASTIC SURGERY FOR BIOPSY.  We will 
continue her hydroxyzine FOR ITCHING, NYSTATIN CREAM as she states she has 
previously diagnosed axillary yeast infections. AS PER YESTERDAY, DERM HAD 
SUGGESTED TRIAMCINOLONE CREAM AND MUPIROCIN OINTMENT.  
YESTERDAY DR. GUERRA PLASTIC SAW THE PATIENT AND HE SEEMS TO THINK IT IS ECZEMA,
FUNGAL INFECTION, PRESSURE/MOISTURE ULCERS.  HE SUGGESTED LUBRICANT BETWEEN THE 
XEROFORM SO WE ORDERED BACITRACIN.  DR. GUERRA TO COME TODAY FOR BEDSIDE BIOPSY 
OF SKIN. 
WE WILL PROVIDE CLOSE NURSING WOUND DRESSING ORDERS FOR STR
Wound care saw her and suggests off-loading left side where most of the pressure
is centered as she often lies on that side.  Also xeroform and gauze dressings 
CONTINUED.  WE WILL REFER HER TO THEM OUTPATIENT.
Patient WAS covered for KLEBSIELLA growth in urine and urine frequency with 
ceftriaxone WHICH WE SWITCHED TO BACTRIM ORAL FOR A 5 DAY COURSE.  WE WILL 
DISCHARGE HER ON 3 DAYS WORTH BACTRIM 800MG DS BID.
 
Falls due to gait instability/morbid obesity
Patient's quality of life is significantly affected by her morbid obesity. 
-PT/OT evaluation.  PYSCHIATRIC EVALUATION AS PATIENT HAS A POOR QUALITY OF LIFE
SECONDARY TO BACK ISSUES/PSYCHIATRIC ISSUES WHICH HAVE CAUSED SIGNIFICANT WEIGHT
GAIN.  PSYCH is following. WE WILL CONFIRM WHICH MEDICATIONS SHOULD BE CONTINUED
AS PATIENT HAD HYPONATREMIA AND SOME OF HER PYSCH MEDS MAY BE CAUSING THIS.
-atarax 25q8 for anxiety- may help with rash/itch but monitor for confustion d/t
anticholinergic effect
-normal TSH 
-B12, RPR/VDRL pending
-PAIN REFERRAL ON DISCHARGE AS PATIENT HAS CHRONIC BACK PAIN
 
Electrolytemia
Hyponatremia/hyperkalemia due to dehydration AND DIARRHEA.  Hydrate with normal 
saline at the rate of 100 mL per hour CONTINUES.   IV calcium gluconate and a 
single dose of Kayexalate given very mild changes in precordial T waves.  
AFTERWARDS, K DIPPED TO 3.3 SO KDUR GIVEN FOR PERSISTENTLY LOW K. NA  
TODAY.
CT ABD PELVIS NEG FOR ANY ACUTE PATHOLOGY
OF NOTE HYDROXYZINE AND ZOLPIDEM CAN CAUSE HYPONATREMIA AS CAN FLUOXETINE WHICH 
SHE WAS ON OUTPATIENT- CONSULT WITH PSYCH ON OUTPATIENT MEDS.
URINE  AND SERUM  FENA<1 RANDOM URINE NA 7.
WE STOPPED FLUIDS AS NA IS .
 
UTI
Patient found to have GNR in urine with increased frequency reported at home.
Started patient on ceftriaxone and switched to bactrim today as per sensitivies.
 patient is PCN allergic.
Removed hicks yesterday.
 
Head lice and fleas at home
PATIENT TOLD US SHE HAS A CURRENT LICE INFECTION.  WE COULD NOT FIND EVIDENCE OF
INFESATION AND HAVE DC'D CONTACT PRECAUTIONS.
 
History of hypertension, coronary artery disease, GERD, degenerative joint 
disease, chronic back pain, urinary incontinence
Currently holding all antihypertensives given mild low blood pressure.  Continue
oxybutynin for urinary incontinence.  ZOLPIDEM FOR SLEEP. ATIVAN PRN FOR ANXIETY
1MG BID. Place her on Hicks catheter.  Follow-up urine culture. 
 
Placement
PT IS SUGGESTING STR- waiting for bed
PATIENT WOULD LIKE NEW PCP AS HERS IS RETIRING- WE WILL REFER TO DR. BOURNE
 
CHRONIC PAIN
WE WILL REFER TO DR. HOLDEN AS SHE HAS CHRONIC BACK PAIN SECONDARY TO 
HERNIATED DISKS.
 
SLEEP APNEA?
PATIENT NEEDS OUTPATIENT WORKUP AS IT WAS NOTED THAT SHE WAS APNEIC LAST NIGHT. 
WE WILL REFER TO PULM
 
DVT prophylaxis
SC heparin
 
Code status
Full code
 
Problem List:
 1. Gait instability
 
 2. Hyponatremia
 
 3. Dermatitis
 
Pain Ratin
Pain Location:
BACK AND SKIN
Pain Goal: Pain 4 or less
Pain Plan:
MORPHINE 2MG IV Q6, PERCOCET, TYLENOL
Tomorrow's Labs & Rationales:
CBC BEP
Consulting Request:
   Consulting Specialty: Dermatology
   Consulting Physician:

   Reason for Consult: Extensive skin eruption
 
 
Augusto Carrasquillo 18 1232:
Attending MD Review Statement
 
Attending Statement
Attending MD Statement: examined this patient, discuss w/resident/PA/NP, agreed 
w/resident/PA/NP, discussed with family, reviewed EMR data (avail), discussed 
with nursing, discussed with case mgmt, reviewed images, amended to note
Attending Assessment/Plan:
52-year-old morbidly obese female who was brought in ER through through EMS 
after found on floor. Fall unwitnessed. Patient found to have hyponatrmeia with 
low urine sodium which is improved with IVF. Her CT abd/pelvis negative for 
acute pathology. Baseline mobility low due to degenrative disease. 
 
1. Accidental fall
2. Hyponatremia 2/2 IVVD
3. Multiple skin lesions excoriations and breakdowns : Suspected secondary 
infection 
4. Mildly transaminitis : Appears to be chronic, probably secondary to 
alcoholism or FAN 
5. Physical deconditioning
6. Fungal rash
7. Head Lice
8. Impetigo on supportive care. 
 
 
- CT abd/pelvis negative for acute pathology.
- IV abx for possible UTI. Change to PO bactrim.
- wound care consult appreciated, supportive care.
- psychiatry consulted and follow recs. 
- Continue bed prodromal hydroxyzine for itching
- Nystatin powder for axillary rash : Candidal intertrigo
- Dermatologic f/u as o/p at [place] or our dermatologist here as per patient 
preference for impetigo. cont supportive care. 
- dc planning on PT recs to STR. 
- plan of care dwed family bedside. Plan for biopsy by Dr Melendez bedside, f/u o/
p. 
- PCP referral at discharge. 
- pulomanry referral at discharge for CPAP.

## 2018-01-24 NOTE — PN- PLASTIC SURGERY
Subjective
Subjective:
Patient complaining of increased pain left axilla
Review of Systems:
All other systems negative
 
Objective
Vital Signs and I&Os
Vital Signs
 
 
Date Time Temp Pulse Resp B/P B/P Pulse O2 O2 Flow FiO2
 
     Mean Ox Delivery Rate 
 
01/24 0702 97.8 99 20 138/68  95   
 
01/23 2227 97.9 110 21 130/60  93 Room Air  
 
01/23 1450 97.7 113 20 129/64  93 Room Air  
 
 
 Intake & Output
 
 
 01/24 1600 01/24 0800 01/24 0000 01/23 1600 01/23 0800 01/23 0000
 
Intake Total   900 1500
 
Output Total    800 700 300
 
Balance  480 480 
 
       
 
Intake, IV    800 700 700
 
Intake, Oral  480 480 600 200 800
 
Output, Urine    800 700 300
 
Patient   324 lb   
 
Weight      
 
 
 
 
Assessment/Plan
Assessment/Plan
No worse.  Biopsy taken after local anesthesia and prep.  Likely low yield as 
wounds appear to be somewhat stable i.e. certainly not increasing,  despite long
-term presence. Treat active problems of eczema, fungus, small open wounds re: 
moist care and pressure avoidance. Folow up wound center for wound care as 
needed. I will not be following patient. Thank you.